# Patient Record
Sex: MALE | Race: BLACK OR AFRICAN AMERICAN | NOT HISPANIC OR LATINO | Employment: FULL TIME | URBAN - METROPOLITAN AREA
[De-identification: names, ages, dates, MRNs, and addresses within clinical notes are randomized per-mention and may not be internally consistent; named-entity substitution may affect disease eponyms.]

---

## 2023-08-16 ENCOUNTER — HOSPITAL ENCOUNTER (OUTPATIENT)
Dept: DATA CONVERSION | Facility: HOSPITAL | Age: 63
Discharge: HOME | End: 2023-08-16
Payer: MEDICARE

## 2023-08-16 DIAGNOSIS — M96.1 POSTLAMINECTOMY SYNDROME, NOT ELSEWHERE CLASSIFIED: ICD-10-CM

## 2023-08-26 LAB
10OH-CARBAZEPINE/CREAT UR CFM: NOT DETECTED NG/MG{CREAT}
6MAM UR QL CFM: NEGATIVE
6MAM/CREAT UR: NORMAL
7AMINOCLONAZEPAM/CREAT UR: NORMAL
8OH-AMOXAPINE UR QL: NOT DETECTED
8OH-LOXAPINE/CREAT UR CFM: NOT DETECTED NG/MG{CREAT}
A-OH ALPRAZ/CREAT UR: NORMAL
A-OH-TRIAZOLAM/CREAT UR CFM: NORMAL NG/MG{CREAT}
ALFENTANIL/CREAT UR CFM: NORMAL NG/MG{CREAT}
ALPHA-HYDROXYMIDAZOLAM: NORMAL
ALPRAZ/CREAT UR CFM: NORMAL NG/MG{CREAT}
AMINO CHLOROPYRIDINE: NOT DETECTED
AMITRIP UR QL CFM: NOT DETECTED
AMOBARBITAL UR QL CFM: NOT DETECTED
AMOXAPINE UR QL: NOT DETECTED
AMPHET/CREAT UR: NORMAL
AMPHETAMINES UR QL CFM: NEGATIVE
ANALGESICS NON-NARCOTIC UR QL: NORMAL
ANTICONVULSANTS UR: NEGATIVE
ANTIDEPRESSANTS UR QL: NORMAL
ANTIHISTAMINES UR QL CFM: NEGATIVE
ANTIPSYCHOTICS UR QL SCN: NEGATIVE
APAP UR QL: PRESENT
ARIPIPRAZOLE/CREAT UR CFM: NOT DETECTED NG/MG{CREAT}
ASENAPINE: NOT DETECTED
ATENOLOL UR QL: NOT DETECTED
ATOMOXETINE/CREAT UR CFM: NOT DETECTED NG/MG{CREAT}
BACLOFEN/CREAT UR CFM: NOT DETECTED NG/MG{CREAT}
BARBITAL UR QL CFM: NOT DETECTED
BARBITURATES UR QL CFM: NEGATIVE
BENZODIAZ UR QL CFM: NEGATIVE
BENZTROPINE UR QL: NOT DETECTED
BROMPHENIRAMINE UR QL: NOT DETECTED
BUPRENORPHINE UR QL CFM: NEGATIVE
BUPRENORPHINE/CREAT UR: NORMAL
BUPROPION UR QL: NOT DETECTED
BUTABARBITAL UR QL CFM: NOT DETECTED
BUTALBITAL UR QL CFM: NOT DETECTED
BUTORPHANOL UR QL CFM: NOT DETECTED
BZE/CREAT UR: NORMAL
CAFFEINE UR QL: NOT DETECTED
CANNABINOIDS UR QL CFM: NEGATIVE
CARBAMAZEPINE UR QL: NOT DETECTED
CARBOXYTHC/CREAT UR: NORMAL
CARISOPRODOL UR QL CFM: NOT DETECTED
CHLORPHENIR UR QL CFM: NOT DETECTED
CHLORPROMAZINE UR QL CFM: NOT DETECTED
CITALOPRAM UR QL: NOT DETECTED
CLOBAZAM UR QL: NOT DETECTED
CLOMIPRAMINE UR QL: NOT DETECTED
CLONAZEPAM/CREAT UR CFM: NORMAL NG/MG{CREAT}
CLONIDINE UR QL: NOT DETECTED
CLOZAPINE UR QL: NOT DETECTED
COCAETHYLENE/CREAT UR CFM: NORMAL NG/MG{CREAT}
COCAINE UR QL CFM: NEGATIVE
COCAINE/CREAT UR CFM: NORMAL NG/MG{CREAT}
CODEINE/CREAT UR: NORMAL
CREAT UR-MCNC: NORMAL MG/DL
CYCLOBENZAPRINE UR QL CFM: NOT DETECTED
D-METHORPHAN UR QL: NOT DETECTED
D-METHORPHAN+LEVORPHANOL UR QL: NORMAL
DESALKYLFLURAZ/CREAT UR: NORMAL NG/MG{CREAT}
DESIPRAMINE UR QL CFM: NOT DETECTED
DESMETHYLCYCLOBENZAPRINE: NOT DETECTED
DESMETHYLFLUNITRAZEPAM: NORMAL
DHC/CREAT UR: NORMAL NG/MG{CREAT}
DIAZEPAM/CREAT UR: NORMAL NG/MG{CREAT}
DICLOFENAC/CREAT UR CFM: NOT DETECTED NG/MG{CREAT}
DIETHYLPROPION UR QL CFM: NOT DETECTED
DILTIAZEM UR QL: NOT DETECTED
DIPHENHY UR QL: NOT DETECTED
DOXEPIN UR QL CFM: NOT DETECTED
DOXYLAMINE UR QL: NOT DETECTED
DRUGS UR CFM: NEGATIVE
DRUGS UR: NORMAL
DULOXETINE UR QL CFM: NOT DETECTED
EDDP/CREAT UR: NORMAL
EPHEDRIN+PSEUDO UR QL: NOT DETECTED
ETHANOL UR CFM-MCNC: NORMAL MG/DL
ETHANOL UR QL CFM: NEGATIVE
EZOGABINE/CREAT UR CFM: NOT DETECTED NG/MG{CREAT}
FENTANYL UR QL CFM: NEGATIVE
FENTANYL/CREAT UR: NORMAL
FLUNITRAZEPAM UR QL CFM: NORMAL
FLUOXETINE UR QL CFM: NOT DETECTED
FLUPHENAZINE UR QL: NOT DETECTED
FLUVOXAMINE UR QL: NOT DETECTED
GABAPENTIN UR QL: NOT DETECTED
GUAIFENESIN/CREAT UR CFM: NOT DETECTED NG/MG{CREAT}
HALLUCINOGENS UR: NEGATIVE
HALOPERIDOL UR QL CFM: NOT DETECTED
HYDROCODONE/CREAT UR: NORMAL
HYDROMORPHONE/CREAT UR: NORMAL
HYDROXYZINE UR QL: NOT DETECTED
HYPNOTICS UR QL SCN: NEGATIVE
IBUPROFEN UR QL: NOT DETECTED
ILOPERIDONE: NOT DETECTED
IMIPRAMINE UR QL CFM: NOT DETECTED
KETAMINE UR QL CFM: NOT DETECTED
KETOPROFEN/CREAT UR CFM: NOT DETECTED NG/MG{CREAT}
LAMOTRIGINE/CREAT UR CFM: NOT DETECTED NG/MG{CREAT}
LEVEL OF DETECTION: (TOX25): NORMAL
LEVETIRACETAM/CREAT UR CFM: NOT DETECTED NG/MG{CREAT}
LIDOCAIN UR QL: NOT DETECTED
LIDOCAIN UR QL: NOT DETECTED
LOCAL ANESTHETICS: NEGATIVE
LORAZEPAM/CREAT UR: NORMAL
LOXAPINE UR QL: NOT DETECTED
LURASIDONE UR CFM-MCNC: NOT DETECTED NG/ML
M-CPP UR QL: PRESENT
MAPROTILINE UR QL: NOT DETECTED
MDA/CREAT UR: NORMAL NG/MG{CREAT}
MDEA UR QL: NOT DETECTED
MDMA/CREAT UR: NORMAL NG/MG{CREAT}
ME-PHENIDATE UR QL CFM: NOT DETECTED
MEPERIDINE UR QL CFM: NOT DETECTED
MEPHOBARBITAL UR QL CFM: NOT DETECTED
MEPIVACAINE UR QL: NOT DETECTED
MEPROBAMATE UR QL CFM: NOT DETECTED
MESORIDAZINE UR QL CFM: NOT DETECTED
METAXALONE/CREAT UR CFM: NOT DETECTED NG/MG{CREAT}
METHADONE UR QL CFM: NEGATIVE
METHADONE/CREAT UR: NORMAL
METHAMPHET/CREAT UR: NORMAL
METHCATHINONE/CREAT UR CFM: NOT DETECTED NG/MG{CREAT}
METHOCARBAMOL UR QL: NOT DETECTED
METOPROLOL UR QL: NOT DETECTED
MIDAZOLAM/CREAT UR CFM: NORMAL NG/MG{CREAT}
MILNACIPRAN/CREAT UR CFM: NOT DETECTED NG/MG{CREAT}
MIRTAZAPINE UR QL CFM: NOT DETECTED
MOLINDONE/CREAT UR CFM: NOT DETECTED NG/MG{CREAT}
MORPHINE/CREAT UR: NORMAL
MUSCLE RELAXANTS: NEGATIVE
N-NORTRAMADOL/CREAT UR CFM: NORMAL NG/MG{CREAT}
NALBUPHINE UR QL CFM: NOT DETECTED
NALTREXONE UR QL CFM: NOT DETECTED
NAPROXEN/CREAT UR CFM: NOT DETECTED NG/MG{CREAT}
NARCOTICS UR: NEGATIVE
NEFAZODONE UR QL: NOT DETECTED
NORBUPRENORPHINE/CREAT UR: NORMAL
NORCITALOPRAM UR QL: NOT DETECTED
NORCLOMIPRAMINE UR QL: NOT DETECTED
NORCLOZAPINE UR QL: NOT DETECTED
NORCODEINE/CREAT UR CFM: NORMAL NG/MG{CREAT}
NORDIAZEPAM/CREAT UR: NORMAL
NORDOXEPIN UR QL: NOT DETECTED
NORFENTANYL/CREAT UR: NORMAL
NORFLUOXETINE UR QL CFM: NOT DETECTED
NORHYDROCODONE/CREAT UR: NORMAL
NORKETAMINE UR CFM-MCNC: NOT DETECTED NG/ML
NORMEPERIDINE UR QL CFM: NOT DETECTED
NORMORPHINE UR-MCNC: NORMAL NG/ML
NOROXYCODONE/CREAT UR: NORMAL
NOROXYMORPHONE/CREAT UR CFM: NORMAL NG/MG{CREAT}
NORPROPOXYPH UR QL CFM: NOT DETECTED
NORSERTRALINE UR QL: NOT DETECTED
NORTRIP UR QL CFM: NOT DETECTED
O-NORTRAMADOL UR CFM-MCNC: NORMAL NG/ML
ODV UR QL: NOT DETECTED
OH-BUPROPION UR QL CFM: NOT DETECTED
OLANZAPINE UR QL: NOT DETECTED
OPIATES UR QL CFM: NEGATIVE
ORPHENADRINE UR QL: NOT DETECTED
OXAPROZIN/CREAT UR CFM: NOT DETECTED NG/MG{CREAT}
OXAZEPAM/CREAT UR: NORMAL
OXYCODONE UR QL CFM: NORMAL
OXYCODONE/CREAT UR: NORMAL
OXYMORPHONE/CREAT UR: NORMAL
PAROXETINE UR QL: NOT DETECTED
PCP UR QL CFM: NOT DETECTED
PENTAZOCINE UR QL CFM: NOT DETECTED
PENTOBARB UR QL CFM: NOT DETECTED
PERPHENAZINE UR QL: NOT DETECTED
PHENMETRAZINE UR QL CFM: NOT DETECTED
PHENOBARB UR QL CFM: NOT DETECTED
PHENTERMINE UR QL CFM: NOT DETECTED
PHENYTOIN UR QL: NOT DETECTED
PIMOZIDE/CREAT UR CFM: NOT DETECTED NG/MG{CREAT}
PPA UR QL: NOT DETECTED
PPAA UR QL: NOT DETECTED
PREGABALIN UR QL CFM: NOT DETECTED
PRIMIDONE/CREAT UR CFM: NOT DETECTED NG/MG{CREAT}
PROCAINE UR QL: NOT DETECTED
PROCHLORPERAZINE UR QL: NOT DETECTED
PROMETHAZINE UR QL: NOT DETECTED
PROPOXYPH UR QL CFM: NOT DETECTED
PROPRANOLOL UR QL: NOT DETECTED
PROTRIP UR QL: NOT DETECTED
PYRILAMINE UR QL: NOT DETECTED
QUETIAPINE UR QL: NOT DETECTED
RISPERIDONE UR QL: NOT DETECTED
RUFINAMIDE/CREAT UR CFM: NOT DETECTED NG/MG{CREAT}
SALICYLATES UR QL: NOT DETECTED
SECOBARBITAL UR QL CFM: NOT DETECTED
SERTRALINE UR QL: NOT DETECTED
SUFENTANIL/CREAT UR CFM: NORMAL NG/MG{CREAT}
SYMPATHOMIMETICS UR QL CFM: NEGATIVE
TAPENTADOL UR QL CFM: NEGATIVE
TAPENTADOL/CREAT UR: NORMAL
TEMAZEPAM/CREAT UR: NORMAL
THEOPHYLLINE/CREAT UR CFM: NOT DETECTED NG/MG{CREAT}
THIOPENTAL UR QL CFM: NOT DETECTED
THIORIDAZINE UR QL CFM: NOT DETECTED
THIOTHIXENE UR QL: NOT DETECTED
TIAGABINE/CREAT UR CFM: NOT DETECTED NG/MG{CREAT}
TIZANIDINE/CREAT UR CFM: NOT DETECTED NG/MG{CREAT}
TOPIRAMATE/CREAT UR CFM: NOT DETECTED NG/MG{CREAT}
TRAMADOL UR QL CFM: NORMAL
TRAZODONE UR QL: PRESENT
TRIFPERAZINE UR QL: NOT DETECTED
TRIMIPRAMINE UR QL: NOT DETECTED
TRIPROLIDINE: NOT DETECTED
VENLAFAXINE UR QL: NOT DETECTED
VERAPAMIL UR QL: NOT DETECTED
VILAZ UR QL: NOT DETECTED
ZALEPLON: NORMAL
ZIPRASIDONE/CREAT UR CFM: NOT DETECTED NG/MG{CREAT}
ZOLPIDEM UR QL CFM: NOT DETECTED
ZOLPIDEM/CREAT UR: NOT DETECTED
ZONISAMIDE/CREAT UR CFM: NOT DETECTED NG/MG{CREAT}
ZOPICLONE UR QL: NOT DETECTED

## 2023-09-13 PROBLEM — B35.1 ONYCHOMYCOSIS: Status: ACTIVE | Noted: 2023-09-13

## 2023-09-13 PROBLEM — R55 SYNCOPE AND COLLAPSE: Status: ACTIVE | Noted: 2023-09-13

## 2023-09-13 PROBLEM — R07.89 TIGHT CHEST: Status: ACTIVE | Noted: 2023-09-13

## 2023-09-13 PROBLEM — L73.2 HYDRADENITIS: Status: ACTIVE | Noted: 2023-09-13

## 2023-09-13 PROBLEM — I10 BENIGN ESSENTIAL HTN: Status: ACTIVE | Noted: 2023-09-13

## 2023-09-13 PROBLEM — F17.210 NICOTINE DEPENDENCE, CIGARETTES, UNCOMPLICATED: Status: ACTIVE | Noted: 2023-09-13

## 2023-09-13 PROBLEM — H91.93 DECREASED HEARING OF BOTH EARS: Status: ACTIVE | Noted: 2023-09-13

## 2023-09-13 PROBLEM — M87.051 AVASCULAR NECROSIS OF BONE OF RIGHT HIP (MULTI): Status: ACTIVE | Noted: 2023-09-13

## 2023-09-13 PROBLEM — R91.8 MULTIPLE LUNG NODULES: Status: ACTIVE | Noted: 2023-09-13

## 2023-09-13 PROBLEM — M87.052 AVASCULAR NECROSIS OF LEFT FEMORAL HEAD (MULTI): Status: ACTIVE | Noted: 2023-09-13

## 2023-09-13 PROBLEM — M16.12 OSTEOARTHRITIS OF LEFT HIP: Status: ACTIVE | Noted: 2023-09-13

## 2023-09-13 PROBLEM — L91.0 KELOID SKIN DISORDER: Status: ACTIVE | Noted: 2023-09-13

## 2023-09-13 PROBLEM — Z98.818 OTHER DENTAL PROCEDURE STATUS: Status: ACTIVE | Noted: 2023-09-13

## 2023-09-13 PROBLEM — E78.5 DYSLIPIDEMIA: Status: ACTIVE | Noted: 2023-09-13

## 2023-09-13 PROBLEM — L28.0 LICHEN SIMPLEX CHRONICUS: Status: ACTIVE | Noted: 2020-05-05

## 2023-09-13 PROBLEM — M54.2 CERVICALGIA: Status: ACTIVE | Noted: 2023-09-13

## 2023-09-13 PROBLEM — I82.409 DEEP VEIN THROMBOSIS (DVT) (MULTI): Status: ACTIVE | Noted: 2023-09-13

## 2023-09-13 PROBLEM — B35.3 TINEA PEDIS: Status: ACTIVE | Noted: 2023-09-13

## 2023-09-13 RX ORDER — TRAZODONE HYDROCHLORIDE 50 MG/1
50 TABLET ORAL NIGHTLY PRN
COMMUNITY
End: 2023-10-13 | Stop reason: WASHOUT

## 2023-09-13 RX ORDER — TRIAMCINOLONE ACETONIDE 1 MG/G
OINTMENT TOPICAL 2 TIMES DAILY
COMMUNITY
Start: 2023-06-19

## 2023-09-13 RX ORDER — TRAZODONE HYDROCHLORIDE 100 MG/1
1 TABLET ORAL NIGHTLY
COMMUNITY
Start: 2018-06-12

## 2023-09-13 RX ORDER — OXYCODONE AND ACETAMINOPHEN 10; 325 MG/1; MG/1
1 TABLET ORAL EVERY 6 HOURS PRN
COMMUNITY
Start: 2023-04-21 | End: 2023-10-13 | Stop reason: SDUPTHER

## 2023-09-13 RX ORDER — LISINOPRIL 10 MG/1
10 TABLET ORAL DAILY
COMMUNITY

## 2023-09-13 RX ORDER — NALOXONE HYDROCHLORIDE 4 MG/.1ML
SPRAY NASAL AS NEEDED
COMMUNITY
Start: 2021-06-22

## 2023-09-13 RX ORDER — CLOBETASOL PROPIONATE 0.5 MG/G
1 OINTMENT TOPICAL
COMMUNITY
Start: 2020-05-05

## 2023-09-13 RX ORDER — IBUPROFEN 200 MG
1 TABLET ORAL DAILY
COMMUNITY
Start: 2021-06-14 | End: 2023-10-13 | Stop reason: WASHOUT

## 2023-09-16 VITALS
BODY MASS INDEX: 24.48 KG/M2 | RESPIRATION RATE: 18 BRPM | OXYGEN SATURATION: 95 % | SYSTOLIC BLOOD PRESSURE: 128 MMHG | HEART RATE: 67 BPM | HEIGHT: 68 IN | DIASTOLIC BLOOD PRESSURE: 68 MMHG

## 2023-10-13 ENCOUNTER — OFFICE VISIT (OUTPATIENT)
Dept: PAIN MEDICINE | Facility: CLINIC | Age: 63
End: 2023-10-13
Payer: COMMERCIAL

## 2023-10-13 VITALS
HEART RATE: 76 BPM | HEIGHT: 68 IN | SYSTOLIC BLOOD PRESSURE: 130 MMHG | DIASTOLIC BLOOD PRESSURE: 80 MMHG | BODY MASS INDEX: 23.57 KG/M2

## 2023-10-13 DIAGNOSIS — M96.1 POSTLAMINECTOMY SYNDROME, LUMBAR REGION: Primary | ICD-10-CM

## 2023-10-13 DIAGNOSIS — Z79.899 HISTORY OF ONGOING TREATMENT WITH HIGH-RISK MEDICATION: ICD-10-CM

## 2023-10-13 PROCEDURE — 99214 OFFICE O/P EST MOD 30 MIN: CPT | Performed by: PHYSICIAN ASSISTANT

## 2023-10-13 PROCEDURE — G0463 HOSPITAL OUTPT CLINIC VISIT: HCPCS | Performed by: PHYSICIAN ASSISTANT

## 2023-10-13 RX ORDER — OXYCODONE AND ACETAMINOPHEN 10; 325 MG/1; MG/1
1 TABLET ORAL EVERY 6 HOURS PRN
Qty: 100 TABLET | Refills: 0 | Status: SHIPPED | OUTPATIENT
Start: 2023-10-13 | End: 2023-11-12 | Stop reason: WASHOUT

## 2023-10-13 RX ORDER — TALC
6 POWDER (GRAM) TOPICAL DAILY
COMMUNITY
Start: 2023-09-26

## 2023-10-13 RX ORDER — OXYCODONE AND ACETAMINOPHEN 10; 325 MG/1; MG/1
1 TABLET ORAL EVERY 6 HOURS PRN
Qty: 110 TABLET | Refills: 0 | Status: SHIPPED | OUTPATIENT
Start: 2023-10-13 | End: 2023-11-28

## 2023-10-13 RX ORDER — NALOXONE HYDROCHLORIDE 4 MG/.1ML
4 SPRAY NASAL AS NEEDED
Qty: 2 EACH | Refills: 0 | Status: SHIPPED | OUTPATIENT
Start: 2023-10-13 | End: 2024-10-12

## 2023-10-13 ASSESSMENT — ENCOUNTER SYMPTOMS
SHORTNESS OF BREATH: 0
WHEEZING: 0
FATIGUE: 0
BACK PAIN: 1
ACTIVITY CHANGE: 0
NAUSEA: 0
FEVER: 0
DIARRHEA: 0
UNEXPECTED WEIGHT CHANGE: 0
COUGH: 0
CHILLS: 0
SLEEP DISTURBANCE: 0
VOMITING: 0
VOICE CHANGE: 0
PALPITATIONS: 0

## 2023-10-13 ASSESSMENT — PAIN - FUNCTIONAL ASSESSMENT: PAIN_FUNCTIONAL_ASSESSMENT: 0-10

## 2023-10-13 ASSESSMENT — PATIENT HEALTH QUESTIONNAIRE - PHQ9
1. LITTLE INTEREST OR PLEASURE IN DOING THINGS: NOT AT ALL
2. FEELING DOWN, DEPRESSED OR HOPELESS: NOT AT ALL
SUM OF ALL RESPONSES TO PHQ9 QUESTIONS 1 AND 2: 0

## 2023-10-13 ASSESSMENT — PAIN DESCRIPTION - DESCRIPTORS: DESCRIPTORS: ACHING

## 2023-10-13 ASSESSMENT — PAIN SCALES - GENERAL: PAINLEVEL_OUTOF10: 5 - MODERATE PAIN

## 2023-10-13 ASSESSMENT — LIFESTYLE VARIABLES: TOTAL SCORE: 0

## 2023-10-13 NOTE — PROGRESS NOTES
MEDICATION NAME: oxycodone w apap  STRENGTH: 10/325  LAST FILL DATE:   DATE LAST TAKEN: 10/13  QUANTITY FILLED: 100  QUANTITY REMAIN  COUNT COMPLETED BY: PETER ENGEL RN and DEION ZENG

## 2023-10-13 NOTE — PROGRESS NOTES
Subjective   Patient ID: Sarah Mg is a 62 y.o. male who presents for Back Pain.  Patient is a 62-year-old male with a workers comp related injury involving failed back syndrome that presents today for follow-up.  Patient continues to have axial back pain radiating into the left lower extremity.  He has been having other systemic aches and pains related to weather pattern changes and decrease in temperature.  He denies any injuries or trauma he continues to get denied procedures that have been ordered by Dr. Sheffield's were reported to be pelvic or epidural injections the patient cannot verbalize the exact procedure but he is frustrated that he has not been able to get additional treatment to reduce his pain.     Back Pain  Pertinent negatives include no chest pain or fever.       Review of Systems   Constitutional:  Negative for activity change, chills, fatigue, fever and unexpected weight change.   HENT:  Negative for ear pain and voice change.    Eyes:  Negative for visual disturbance.   Respiratory:  Negative for cough, shortness of breath and wheezing.    Cardiovascular:  Negative for chest pain and palpitations.   Gastrointestinal:  Negative for diarrhea, nausea and vomiting.   Musculoskeletal:  Positive for back pain and gait problem.   Psychiatric/Behavioral:  Negative for behavioral problems, self-injury, sleep disturbance and suicidal ideas.        Objective   Physical Exam  Musculoskeletal:      Lumbar back: Spasms and tenderness present. Positive left straight leg raise test.        Back:       Comments: STR WNL, hypo sensation lateral lle thigh. DTR = BLE, antalgic gait.          Assessment/Plan   Problem List Items Addressed This Visit             ICD-10-CM    Postlaminectomy syndrome, lumbar region - Primary M96.1    Relevant Medications    naloxone (Narcan) 4 mg/0.1 mL nasal spray     Other Visit Diagnoses         Codes    History of ongoing treatment with high-risk medication     Z79.899     Relevant Medications    oxyCODONE-acetaminophen (Percocet)  mg tablet    oxyCODONE-acetaminophen (Percocet)  mg tablet        I had nice discussion with the patient today our plan will be as follows.      Radiology: [ none at this time ]      Physically:  [ continue modification of activities, healthy lifestyle choice ]      Psychologically:  [ No acute psychological concerns ]      Medication: [I will refill the medications at the same dose and frequency. We will continue to monitor the patient bimonthly for compliance, adverse reaction or interations The patient continues to see benefit and improvement in their quality of life and ability to maintain ADLs. Patient educated about the risks of taking opioids and operating a motor vehicle. Patient reports no adverse side effects to current medication regimen. Current regimen does allow patient to maintain ADLs. Oarrs has been reviewed. No suspicion of diversion or abuse. Compliance with medication regime, no use of illicit drugs, no sharing of narcotic medications with others, do not use others narcotic medication, and to avoid alcohol use. Patient has been educated on the risks, benefits, and alternatives of controlled substances as well as the proper way to store these medications.   The patient and I discussed the nature of this medication and its side effects. We discussed tolerance, physical dependence, psychological dependence, addiction and opioid-induced hyperalgesia.    Rx alter for pt travel to [place] 10/28/23 for 3 weeks.  ]      Duration:  [ 2 months ]      Intervention:  [ none at this time. Patient is stable.  ]

## 2023-11-27 DIAGNOSIS — Z79.899 HISTORY OF ONGOING TREATMENT WITH HIGH-RISK MEDICATION: ICD-10-CM

## 2023-11-28 RX ORDER — OXYCODONE AND ACETAMINOPHEN 10; 325 MG/1; MG/1
1 TABLET ORAL EVERY 6 HOURS PRN
Qty: 110 TABLET | Refills: 0 | Status: SHIPPED | OUTPATIENT
Start: 2023-11-28 | End: 2023-12-08 | Stop reason: SDUPTHER

## 2023-12-08 ENCOUNTER — OFFICE VISIT (OUTPATIENT)
Dept: PAIN MEDICINE | Facility: CLINIC | Age: 63
End: 2023-12-08
Payer: COMMERCIAL

## 2023-12-08 VITALS
RESPIRATION RATE: 16 BRPM | WEIGHT: 155 LBS | HEIGHT: 68 IN | DIASTOLIC BLOOD PRESSURE: 80 MMHG | BODY MASS INDEX: 23.49 KG/M2 | SYSTOLIC BLOOD PRESSURE: 122 MMHG

## 2023-12-08 DIAGNOSIS — M96.1 POSTLAMINECTOMY SYNDROME OF LUMBAR REGION: ICD-10-CM

## 2023-12-08 DIAGNOSIS — F17.210 NICOTINE DEPENDENCE, CIGARETTES, UNCOMPLICATED: ICD-10-CM

## 2023-12-08 DIAGNOSIS — Z79.899 HISTORY OF ONGOING TREATMENT WITH HIGH-RISK MEDICATION: Primary | ICD-10-CM

## 2023-12-08 PROBLEM — I82.409 DEEP VEIN THROMBOSIS (DVT) (MULTI): Status: ACTIVE | Noted: 2021-12-06

## 2023-12-08 PROBLEM — M25.551 RIGHT HIP PAIN: Status: ACTIVE | Noted: 2021-12-06

## 2023-12-08 PROBLEM — D68.59 HYPERCOAGULABLE STATE (MULTI): Status: ACTIVE | Noted: 2023-06-14

## 2023-12-08 PROBLEM — R55 SYNCOPAL EPISODES: Status: ACTIVE | Noted: 2021-12-06

## 2023-12-08 PROBLEM — N52.9 ERECTILE DYSFUNCTION: Status: ACTIVE | Noted: 2023-12-08

## 2023-12-08 PROBLEM — F52.21 MALE ERECTILE DISORDER (CODE): Status: ACTIVE | Noted: 2023-12-08

## 2023-12-08 PROBLEM — R35.1 NOCTURIA: Status: ACTIVE | Noted: 2023-12-08

## 2023-12-08 PROBLEM — M54.9 CHRONIC BACK PAIN GREATER THAN 3 MONTHS DURATION: Status: ACTIVE | Noted: 2023-12-08

## 2023-12-08 PROBLEM — I10 BENIGN ESSENTIAL HYPERTENSION: Status: ACTIVE | Noted: 2023-12-08

## 2023-12-08 PROBLEM — F41.9 ANXIETY: Status: ACTIVE | Noted: 2023-12-08

## 2023-12-08 PROBLEM — D64.9 ANEMIA: Status: ACTIVE | Noted: 2023-12-08

## 2023-12-08 PROBLEM — G47.00 INSOMNIA: Status: ACTIVE | Noted: 2023-12-08

## 2023-12-08 PROBLEM — E55.9 VITAMIN D DEFICIENCY: Status: ACTIVE | Noted: 2023-12-08

## 2023-12-08 PROBLEM — G89.29 CHRONIC BACK PAIN GREATER THAN 3 MONTHS DURATION: Status: ACTIVE | Noted: 2023-12-08

## 2023-12-08 PROBLEM — H90.3 SENSORINEURAL HEARING LOSS, BILATERAL: Status: ACTIVE | Noted: 2023-12-08

## 2023-12-08 PROBLEM — Z96.641 STATUS POST RIGHT HIP REPLACEMENT: Status: ACTIVE | Noted: 2021-12-21

## 2023-12-08 PROBLEM — M25.519 SHOULDER PAIN: Status: ACTIVE | Noted: 2023-12-08

## 2023-12-08 PROBLEM — N18.31 STAGE 3A CHRONIC KIDNEY DISEASE (MULTI): Status: ACTIVE | Noted: 2023-06-19

## 2023-12-08 PROBLEM — R63.4 WEIGHT LOSS: Status: ACTIVE | Noted: 2023-12-08

## 2023-12-08 PROBLEM — F10.11 HISTORY OF ALCOHOL ABUSE: Status: ACTIVE | Noted: 2023-12-08

## 2023-12-08 PROCEDURE — 80307 DRUG TEST PRSMV CHEM ANLYZR: CPT | Performed by: PHYSICIAN ASSISTANT

## 2023-12-08 PROCEDURE — 99214 OFFICE O/P EST MOD 30 MIN: CPT | Performed by: PHYSICIAN ASSISTANT

## 2023-12-08 PROCEDURE — 82570 ASSAY OF URINE CREATININE: CPT | Mod: 59 | Performed by: PHYSICIAN ASSISTANT

## 2023-12-08 PROCEDURE — 80368 SEDATIVE HYPNOTICS: CPT | Performed by: PHYSICIAN ASSISTANT

## 2023-12-08 PROCEDURE — G0463 HOSPITAL OUTPT CLINIC VISIT: HCPCS | Performed by: PHYSICIAN ASSISTANT

## 2023-12-08 RX ORDER — LISINOPRIL AND HYDROCHLOROTHIAZIDE 10; 12.5 MG/1; MG/1
1 TABLET ORAL DAILY
COMMUNITY
Start: 2023-10-10

## 2023-12-08 RX ORDER — OXYCODONE AND ACETAMINOPHEN 10; 325 MG/1; MG/1
1 TABLET ORAL EVERY 6 HOURS PRN
Qty: 110 TABLET | Refills: 0 | Status: SHIPPED | OUTPATIENT
Start: 2023-12-08 | End: 2024-02-22 | Stop reason: SDUPTHER

## 2023-12-08 RX ORDER — OXYCODONE AND ACETAMINOPHEN 10; 325 MG/1; MG/1
1 TABLET ORAL EVERY 6 HOURS PRN
Qty: 110 TABLET | Refills: 0 | Status: SHIPPED | OUTPATIENT
Start: 2023-12-08 | End: 2024-04-22 | Stop reason: SDUPTHER

## 2023-12-08 RX ORDER — CYCLOBENZAPRINE HCL 5 MG
5 TABLET ORAL NIGHTLY PRN
Qty: 30 TABLET | Refills: 1 | Status: SHIPPED | OUTPATIENT
Start: 2023-12-08 | End: 2025-02-22

## 2023-12-08 RX ORDER — CYCLOBENZAPRINE HCL 5 MG
5 TABLET ORAL NIGHTLY PRN
COMMUNITY
Start: 2023-06-21 | End: 2023-12-08 | Stop reason: SDUPTHER

## 2023-12-08 ASSESSMENT — PATIENT HEALTH QUESTIONNAIRE - PHQ9
2. FEELING DOWN, DEPRESSED OR HOPELESS: NOT AT ALL
SUM OF ALL RESPONSES TO PHQ9 QUESTIONS 1 AND 2: 0
1. LITTLE INTEREST OR PLEASURE IN DOING THINGS: NOT AT ALL

## 2023-12-08 ASSESSMENT — ENCOUNTER SYMPTOMS
VOMITING: 0
CHILLS: 0
VOICE CHANGE: 0
ACTIVITY CHANGE: 0
BACK PAIN: 1
SLEEP DISTURBANCE: 0
PALPITATIONS: 0
NAUSEA: 0
DIARRHEA: 0
FATIGUE: 0
UNEXPECTED WEIGHT CHANGE: 0
COUGH: 0
SHORTNESS OF BREATH: 0
WHEEZING: 0
FEVER: 0

## 2023-12-08 ASSESSMENT — PAIN DESCRIPTION - DESCRIPTORS: DESCRIPTORS: ACHING;DULL

## 2023-12-08 ASSESSMENT — PAIN SCALES - GENERAL
PAINLEVEL_OUTOF10: 6
PAINLEVEL: 6

## 2023-12-08 ASSESSMENT — LIFESTYLE VARIABLES: TOTAL SCORE: 0

## 2023-12-08 ASSESSMENT — PAIN - FUNCTIONAL ASSESSMENT: PAIN_FUNCTIONAL_ASSESSMENT: 0-10

## 2023-12-08 NOTE — PROGRESS NOTES
Subjective   Patient ID: Sarah Mg is a 63 y.o. male who presents for Back Pain.  Patient is a 63-year-old male with postlaminectomy syndrome nicotine dependence and high risk use of medications presents today for follow-up.  Patient denies of Dr. Sheffield is still attempting to try to get injections approved.  There seems to be some new to allowable conditions to his Worker's Comp. claim.  Continues to have axial back pain continued bilateral hip pain and pressure.    Back Pain  Pertinent negatives include no chest pain or fever.       Review of Systems   Constitutional:  Negative for activity change, chills, fatigue, fever and unexpected weight change.   HENT:  Negative for ear pain and voice change.    Eyes:  Negative for visual disturbance.   Respiratory:  Negative for cough, shortness of breath and wheezing.    Cardiovascular:  Negative for chest pain and palpitations.   Gastrointestinal:  Negative for diarrhea, nausea and vomiting.   Musculoskeletal:  Positive for back pain and gait problem.   Psychiatric/Behavioral:  Negative for behavioral problems, self-injury, sleep disturbance and suicidal ideas.        Objective   Physical Exam  Vitals reviewed.   Constitutional:       Appearance: Normal appearance.   HENT:      Head: Normocephalic and atraumatic.      Mouth/Throat:      Mouth: Mucous membranes are moist.   Neck:      Vascular: No JVD.   Pulmonary:      Effort: Pulmonary effort is normal. No tachypnea or bradypnea.   Abdominal:      Palpations: Abdomen is soft.   Musculoskeletal:      Lumbar back: Spasms and tenderness present. Positive left straight leg raise test.        Back:       Comments: STR WNL, hypo sensation lateral lle thigh. DTR = BLE, antalgic gait.    Skin:     General: Skin is warm and dry.   Neurological:      Mental Status: He is alert and oriented to person, place, and time.   Psychiatric:         Mood and Affect: Mood normal.         Behavior: Behavior normal. Behavior is  cooperative.         Assessment/Plan   Problem List Items Addressed This Visit             ICD-10-CM    Nicotine dependence, cigarettes, uncomplicated F17.210    Postlaminectomy syndrome of lumbar region M96.1    Relevant Orders    Opiate/Opioid/Benzo Prescription Compliance    History of ongoing treatment with high-risk medication - Primary Z79.899    Relevant Orders    Opiate/Opioid/Benzo Prescription Compliance   I had nice discussion with the patient today our plan will be as follows.      Radiology: [ none at this time ]      Physically:  [ continue modification of activities, healthy lifestyle choice, Patient smokes 1ppd. Encouraged/counseled on smoking cessation as this may increase systemic inflammatory factors which may contribute to patient's chronic pain in addition to smoking as generalized health detriments.   ]      Psychologically:  [ No acute psychological concerns ]      Medication: [I will refill the medications at the same dose and frequency. We will continue to monitor the patient bimonthly for compliance, adverse reaction or interations The patient continues to see benefit and improvement in their quality of life and ability to maintain ADLs. Patient educated about the risks of taking opioids and operating a motor vehicle. Patient reports no adverse side effects to current medication regimen. Current regimen does allow patient to maintain ADLs. Oarrs has been reviewed. No suspicion of diversion or abuse. Compliance with medication regime, no use of illicit drugs, no sharing of narcotic medications with others, do not use others narcotic medication, and to avoid alcohol use. Patient has been educated on the risks, benefits, and alternatives of controlled substances as well as the proper way to store these medications.   The patient and I discussed the nature of this medication and its side effects. We discussed tolerance, physical dependence, psychological dependence, addiction and opioid-induced  hyperalgesia ]      Duration:  [2 months  ]      Intervention:  [ none at this time. Patient is stable.  ]           Vito Chaves PA-C 12/08/23 11:42 AM

## 2023-12-08 NOTE — PROGRESS NOTES
MEDICATION NAME: percocet  STRENGTH: 10/325mg  LAST FILL DATE: 23  DATE LAST TAKEN: 23 pm  QUANTITY FILLED: 110  QUANTITY REMAININ  COUNT COMPLETED BY: YAKELIN GUTIERREZ LPN and PETER ENGEL RN        CONTROLLED SUBSTANCES AGREEMENT LAST SIGNED: 2023  ORT LAST COMPLETED:2023  Modified [employer] disability form filled out annually.

## 2023-12-09 LAB
AMPHETAMINES UR QL SCN: NORMAL
BARBITURATES UR QL SCN: NORMAL
BZE UR QL SCN: NORMAL
CANNABINOIDS UR QL SCN: NORMAL
CREAT UR-MCNC: 163.3 MG/DL (ref 20–370)
PCP UR QL SCN: NORMAL

## 2023-12-13 LAB
1OH-MIDAZOLAM UR CFM-MCNC: <25 NG/ML
6MAM UR CFM-MCNC: <25 NG/ML
7AMINOCLONAZEPAM UR CFM-MCNC: <25 NG/ML
A-OH ALPRAZ UR CFM-MCNC: <25 NG/ML
ALPRAZ UR CFM-MCNC: <25 NG/ML
CHLORDIAZEP UR CFM-MCNC: <25 NG/ML
CLONAZEPAM UR CFM-MCNC: <25 NG/ML
CODEINE UR CFM-MCNC: <50 NG/ML
DIAZEPAM UR CFM-MCNC: <25 NG/ML
EDDP UR CFM-MCNC: <25 NG/ML
FENTANYL UR CFM-MCNC: <2.5 NG/ML
HYDROCODONE CTO UR CFM-MCNC: <25 NG/ML
HYDROMORPHONE UR CFM-MCNC: <25 NG/ML
LORAZEPAM UR CFM-MCNC: <25 NG/ML
METHADONE UR CFM-MCNC: <25 NG/ML
MIDAZOLAM UR CFM-MCNC: <25 NG/ML
MORPHINE UR CFM-MCNC: <50 NG/ML
NORDIAZEPAM UR CFM-MCNC: <25 NG/ML
NORFENTANYL UR CFM-MCNC: <2.5 NG/ML
NORHYDROCODONE UR CFM-MCNC: <25 NG/ML
NOROXYCODONE UR CFM-MCNC: >1000 NG/ML
NORTRAMADOL UR-MCNC: <50 NG/ML
OXAZEPAM UR CFM-MCNC: <25 NG/ML
OXYCODONE UR CFM-MCNC: 872 NG/ML
OXYMORPHONE UR CFM-MCNC: 1944 NG/ML
TEMAZEPAM UR CFM-MCNC: <25 NG/ML
TRAMADOL UR CFM-MCNC: <50 NG/ML
ZOLPIDEM UR CFM-MCNC: <25 NG/ML
ZOLPIDEM UR-MCNC: <25 NG/ML

## 2024-02-22 ENCOUNTER — OFFICE VISIT (OUTPATIENT)
Dept: PAIN MEDICINE | Facility: CLINIC | Age: 64
End: 2024-02-22
Payer: COMMERCIAL

## 2024-02-22 VITALS
SYSTOLIC BLOOD PRESSURE: 132 MMHG | DIASTOLIC BLOOD PRESSURE: 79 MMHG | HEART RATE: 68 BPM | HEIGHT: 68 IN | BODY MASS INDEX: 23.49 KG/M2 | WEIGHT: 155 LBS

## 2024-02-22 DIAGNOSIS — Z79.899 HISTORY OF ONGOING TREATMENT WITH HIGH-RISK MEDICATION: Primary | ICD-10-CM

## 2024-02-22 DIAGNOSIS — M96.1 POSTLAMINECTOMY SYNDROME OF LUMBAR REGION: ICD-10-CM

## 2024-02-22 PROCEDURE — 99214 OFFICE O/P EST MOD 30 MIN: CPT | Performed by: PHYSICIAN ASSISTANT

## 2024-02-22 PROCEDURE — 80346 BENZODIAZEPINES1-12: CPT | Performed by: PHYSICIAN ASSISTANT

## 2024-02-22 PROCEDURE — 80307 DRUG TEST PRSMV CHEM ANLYZR: CPT | Performed by: PHYSICIAN ASSISTANT

## 2024-02-22 PROCEDURE — G0463 HOSPITAL OUTPT CLINIC VISIT: HCPCS | Performed by: PHYSICIAN ASSISTANT

## 2024-02-22 RX ORDER — OXYCODONE AND ACETAMINOPHEN 10; 325 MG/1; MG/1
1 TABLET ORAL EVERY 6 HOURS PRN
Qty: 110 TABLET | Refills: 0 | Status: SHIPPED | OUTPATIENT
Start: 2024-02-22 | End: 2024-04-22 | Stop reason: SDUPTHER

## 2024-02-22 RX ORDER — OXYCODONE AND ACETAMINOPHEN 10; 325 MG/1; MG/1
1 TABLET ORAL EVERY 6 HOURS PRN
Qty: 110 TABLET | Refills: 0 | Status: SHIPPED | OUTPATIENT
Start: 2024-02-22 | End: 2024-04-22 | Stop reason: WASHOUT

## 2024-02-22 RX ORDER — CYCLOBENZAPRINE HCL 10 MG
10 TABLET ORAL 3 TIMES DAILY PRN
Qty: 21 TABLET | Refills: 2 | Status: SHIPPED | OUTPATIENT
Start: 2024-02-22 | End: 2024-03-14

## 2024-02-22 ASSESSMENT — ENCOUNTER SYMPTOMS
VOICE CHANGE: 0
COUGH: 0
ACTIVITY CHANGE: 0
VOMITING: 0
SLEEP DISTURBANCE: 0
SHORTNESS OF BREATH: 0
UNEXPECTED WEIGHT CHANGE: 0
FATIGUE: 0
CHILLS: 0
FEVER: 0
DIARRHEA: 0
BACK PAIN: 1
WHEEZING: 0
NAUSEA: 0
PALPITATIONS: 0

## 2024-02-22 ASSESSMENT — PAIN DESCRIPTION - DESCRIPTORS: DESCRIPTORS: DULL;ACHING

## 2024-02-22 ASSESSMENT — PAIN - FUNCTIONAL ASSESSMENT: PAIN_FUNCTIONAL_ASSESSMENT: 0-10

## 2024-02-22 ASSESSMENT — PAIN SCALES - GENERAL: PAINLEVEL_OUTOF10: 6

## 2024-02-22 NOTE — PROGRESS NOTES
Subjective   Patient ID: Sarah Mg is a 63 y.o. male who presents for Back Pain.  HPI    Review of Systems   Constitutional:  Negative for activity change, chills, fatigue, fever and unexpected weight change.   HENT:  Negative for ear pain and voice change.    Eyes:  Negative for visual disturbance.   Respiratory:  Negative for cough, shortness of breath and wheezing.    Cardiovascular:  Negative for chest pain and palpitations.   Gastrointestinal:  Negative for diarrhea, nausea and vomiting.   Musculoskeletal:  Positive for back pain and gait problem.   Psychiatric/Behavioral:  Negative for behavioral problems, self-injury, sleep disturbance and suicidal ideas.        Objective   Physical Exam  Vitals reviewed.   Constitutional:       Appearance: Normal appearance.   HENT:      Head: Normocephalic and atraumatic.      Mouth/Throat:      Mouth: Mucous membranes are moist.   Neck:      Vascular: No JVD.   Pulmonary:      Effort: Pulmonary effort is normal. No tachypnea or bradypnea.   Abdominal:      Palpations: Abdomen is soft.   Musculoskeletal:      Lumbar back: Spasms and tenderness present. Positive left straight leg raise test.        Back:       Comments: STR WNL, hypo sensation lateral lle thigh. DTR = BLE, antalgic gait.    Skin:     General: Skin is warm and dry.   Neurological:      Mental Status: He is alert and oriented to person, place, and time.   Psychiatric:         Mood and Affect: Mood normal.         Behavior: Behavior normal. Behavior is cooperative.         Assessment/Plan   Problem List Items Addressed This Visit             ICD-10-CM    Postlaminectomy syndrome of lumbar region M96.1     I had nice discussion with the patient today our plan will be as follows.      Radiology: [ none at this time ]      Physically:  [ continue modification of activities, healthy lifestyle choice ]      Psychologically:  [ No acute psychological concerns ]      Medication: [I will refill the medications at  the same dose and frequency. We will continue to monitor the patient bimonthly for compliance, adverse reaction or interations The patient continues to see benefit and improvement in their quality of life and ability to maintain ADLs. Patient educated about the risks of taking opioids and operating a motor vehicle. Patient reports no adverse side effects to current medication regimen. Current regimen does allow patient to maintain ADLs. Oarrs has been reviewed. No suspicion of diversion or abuse. Compliance with medication regime, no use of illicit drugs, no sharing of narcotic medications with others, do not use others narcotic medication, and to avoid alcohol use. Patient has been educated on the risks, benefits, and alternatives of controlled substances as well as the proper way to store these medications.   The patient and I discussed the nature of this medication and its side effects. We discussed tolerance, physical dependence, psychological dependence, addiction and opioid-induced hyperalgesia   Pt to submit sample for tox screen. ]      Duration:  [ 2 month ]      Intervention:  [ none at this time. Patient is stable.  ]         Vito Chaves PA-C 02/22/24 11:17 AM

## 2024-02-22 NOTE — PROGRESS NOTES
MEDICATION NAME: oxycodone w/apap  STRENGTH: 10 mg  LAST FILL DATE: 2024  DATE LAST TAKEN: 2024  QUANTITY FILLED: 110  QUANTITY REMAININ  COUNT COMPLETED BY: JOAN ELKINS and GUILLE Cameron    CONTROLLED SUBSTANCES AGREEMENT LAST SIGNED: 2024  ORT LAST COMPLETED: 2023  Modified [employer] disability form filled out annually.    spO2: 100

## 2024-02-23 LAB
AMPHETAMINES UR QL SCN: NORMAL
BARBITURATES UR QL SCN: NORMAL
BZE UR QL SCN: NORMAL
CANNABINOIDS UR QL SCN: NORMAL
CREAT UR-MCNC: 135.7 MG/DL (ref 20–370)
PCP UR QL SCN: NORMAL

## 2024-02-29 LAB
1OH-MIDAZOLAM UR CFM-MCNC: <25 NG/ML
6MAM UR CFM-MCNC: <25 NG/ML
7AMINOCLONAZEPAM UR CFM-MCNC: <25 NG/ML
A-OH ALPRAZ UR CFM-MCNC: <25 NG/ML
ALPRAZ UR CFM-MCNC: <25 NG/ML
CHLORDIAZEP UR CFM-MCNC: <25 NG/ML
CLONAZEPAM UR CFM-MCNC: <25 NG/ML
CODEINE UR CFM-MCNC: <50 NG/ML
DIAZEPAM UR CFM-MCNC: <25 NG/ML
EDDP UR CFM-MCNC: <25 NG/ML
FENTANYL UR CFM-MCNC: <2.5 NG/ML
HYDROCODONE CTO UR CFM-MCNC: <25 NG/ML
HYDROMORPHONE UR CFM-MCNC: <25 NG/ML
LORAZEPAM UR CFM-MCNC: <25 NG/ML
METHADONE UR CFM-MCNC: <25 NG/ML
MIDAZOLAM UR CFM-MCNC: <25 NG/ML
MORPHINE UR CFM-MCNC: <50 NG/ML
NORDIAZEPAM UR CFM-MCNC: <25 NG/ML
NORFENTANYL UR CFM-MCNC: <2.5 NG/ML
NORHYDROCODONE UR CFM-MCNC: <25 NG/ML
NOROXYCODONE UR CFM-MCNC: >1000 NG/ML
NORTRAMADOL UR-MCNC: <50 NG/ML
OXAZEPAM UR CFM-MCNC: <25 NG/ML
OXYCODONE UR CFM-MCNC: 451 NG/ML
OXYMORPHONE UR CFM-MCNC: 1266 NG/ML
TEMAZEPAM UR CFM-MCNC: <25 NG/ML
TRAMADOL UR CFM-MCNC: <50 NG/ML
ZOLPIDEM UR CFM-MCNC: <25 NG/ML
ZOLPIDEM UR-MCNC: <25 NG/ML

## 2024-04-22 ENCOUNTER — OFFICE VISIT (OUTPATIENT)
Dept: PAIN MEDICINE | Facility: CLINIC | Age: 64
End: 2024-04-22
Payer: COMMERCIAL

## 2024-04-22 VITALS
DIASTOLIC BLOOD PRESSURE: 80 MMHG | OXYGEN SATURATION: 98 % | HEIGHT: 68 IN | RESPIRATION RATE: 18 BRPM | HEART RATE: 66 BPM | BODY MASS INDEX: 22.88 KG/M2 | SYSTOLIC BLOOD PRESSURE: 130 MMHG | WEIGHT: 151 LBS

## 2024-04-22 DIAGNOSIS — M96.1 POSTLAMINECTOMY SYNDROME OF LUMBAR REGION: ICD-10-CM

## 2024-04-22 PROCEDURE — 99214 OFFICE O/P EST MOD 30 MIN: CPT | Performed by: PHYSICIAN ASSISTANT

## 2024-04-22 RX ORDER — OXYCODONE AND ACETAMINOPHEN 10; 325 MG/1; MG/1
1 TABLET ORAL EVERY 6 HOURS PRN
Qty: 110 TABLET | Refills: 0 | Status: SHIPPED | OUTPATIENT
Start: 2024-04-22

## 2024-04-22 ASSESSMENT — ENCOUNTER SYMPTOMS
ACTIVITY CHANGE: 0
FATIGUE: 0
NAUSEA: 0
VOMITING: 0
BACK PAIN: 1
UNEXPECTED WEIGHT CHANGE: 0
FEVER: 0
COUGH: 0
SLEEP DISTURBANCE: 0
CHILLS: 0
SHORTNESS OF BREATH: 0
DIARRHEA: 0
PALPITATIONS: 0
WHEEZING: 0
VOICE CHANGE: 0

## 2024-04-22 ASSESSMENT — LIFESTYLE VARIABLES
AUDIT-C TOTAL SCORE: 0
HOW MANY STANDARD DRINKS CONTAINING ALCOHOL DO YOU HAVE ON A TYPICAL DAY: PATIENT DOES NOT DRINK
HOW OFTEN DO YOU HAVE SIX OR MORE DRINKS ON ONE OCCASION: NEVER
HOW OFTEN DO YOU HAVE A DRINK CONTAINING ALCOHOL: NEVER
SKIP TO QUESTIONS 9-10: 1

## 2024-04-22 ASSESSMENT — PATIENT HEALTH QUESTIONNAIRE - PHQ9
2. FEELING DOWN, DEPRESSED OR HOPELESS: NOT AT ALL
SUM OF ALL RESPONSES TO PHQ9 QUESTIONS 1 & 2: 0
1. LITTLE INTEREST OR PLEASURE IN DOING THINGS: NOT AT ALL

## 2024-04-22 ASSESSMENT — PAIN SCALES - GENERAL
PAINLEVEL: 7
PAINLEVEL_OUTOF10: 7

## 2024-04-22 ASSESSMENT — PAIN - FUNCTIONAL ASSESSMENT: PAIN_FUNCTIONAL_ASSESSMENT: 0-10

## 2024-04-22 ASSESSMENT — PAIN DESCRIPTION - DESCRIPTORS: DESCRIPTORS: ACHING;DULL

## 2024-04-22 NOTE — PROGRESS NOTES
MEDICATION NAME: Oxycodone   STRENGTH:   LAST FILL DATE: 3/27/24  DATE LAST TAKEN: 4/21/24  QUANTITY FILLED: 110  QUANTITY REMAINING: 10  COUNT COMPLETED BY: GUILLE Cameron and SAMY Magana      UDS LAST COMPLETED:   CONTROLLED SUBSTANCES AGREEMENT LAST SIGNED:   ORT LAST COMPLETED:  Modified [employer] disability form filled out annually.

## 2024-04-22 NOTE — PROGRESS NOTES
Subjective   Patient ID: Sarah Mg is a 63 y.o. male who presents for Back Pain.  The Worker's Comp. related injury of postlaminectomy syndrome presents today for follow-up.  Patient continues to have axial back pain left-sided hip pain patient did notes that procedures have been approved after his physician recommended them but then imaging was required there is been delayed due to not being able to complete them during the approval peroid.  Patient states that he is just waiting for Worker's Comp. to allow the treatments that the surgeon is recommending.  He continues to use his medication continues to modify his activity he denies any specific new injuries traumas or disprove fusional pain patterns    Back Pain  Pertinent negatives include no chest pain or fever.       Review of Systems   Constitutional:  Negative for activity change, chills, fatigue, fever and unexpected weight change.   HENT:  Negative for ear pain and voice change.    Eyes:  Negative for visual disturbance.   Respiratory:  Negative for cough, shortness of breath and wheezing.    Cardiovascular:  Negative for chest pain and palpitations.   Gastrointestinal:  Negative for diarrhea, nausea and vomiting.   Musculoskeletal:  Positive for back pain and gait problem.   Psychiatric/Behavioral:  Negative for behavioral problems, self-injury, sleep disturbance and suicidal ideas.        Objective   Physical Exam  Vitals reviewed.   Constitutional:       Appearance: Normal appearance.   HENT:      Head: Normocephalic and atraumatic.      Mouth/Throat:      Mouth: Mucous membranes are moist.   Neck:      Vascular: No JVD.   Pulmonary:      Effort: Pulmonary effort is normal. No tachypnea or bradypnea.   Abdominal:      Palpations: Abdomen is soft.   Musculoskeletal:      Lumbar back: Spasms and tenderness present. Positive left straight leg raise test.        Back:       Comments: STR WNL, hypo sensation lateral lle thigh. DTR = BLE, antalgic gait.     Skin:     General: Skin is warm and dry.   Neurological:      Mental Status: He is alert and oriented to person, place, and time.   Psychiatric:         Mood and Affect: Mood normal.         Behavior: Behavior normal. Behavior is cooperative.       Assessment/Plan   Problem List Items Addressed This Visit             ICD-10-CM    Postlaminectomy syndrome of lumbar region M96.1     I had nice discussion with the patient today our plan will be as follows.      Radiology: [ Pt is awaiting extension for imaging approvals ]      Physically:  [ continue modification of activities, healthy lifestyle choice ]      Psychologically:  [ No acute psychological concerns ]      Medication: [I will refill the medications at the same dose and frequency. We will continue to monitor the patient bimonthly for compliance, adverse reaction or interations The patient continues to see benefit and improvement in their quality of life and ability to maintain ADLs. Patient educated about the risks of taking opioids and operating a motor vehicle. Patient reports no adverse side effects to current medication regimen. Current regimen does allow patient to maintain ADLs. Oarrs has been reviewed. No suspicion of diversion or abuse. Compliance with medication regime, no use of illicit drugs, no sharing of narcotic medications with others, do not use others narcotic medication, and to avoid alcohol use. Patient has been educated on the risks, benefits, and alternatives of controlled substances as well as the proper way to store these medications.   The patient and I discussed the nature of this medication and its side effects. We discussed tolerance, physical dependence, psychological dependence, addiction and opioid-induced hyperalgesia  Uds reviewed and compliant.  ]      Duration:  [ 2 months ]      Intervention:  [ Surgeons are working up for treatments but worker comp delaying care due to approval paper work  ]         Vito Chaves,  KEYUR 04/22/24 11:02 AM

## 2024-04-29 ENCOUNTER — TELEPHONE (OUTPATIENT)
Dept: PAIN MEDICINE | Facility: CLINIC | Age: 64
End: 2024-04-29

## 2024-04-29 NOTE — TELEPHONE ENCOUNTER
Pt called in regards to percocet medication. Stated that pharmacy refused to fill 4/27 because they had a note in the order to fill it for June, which I could not find. I called [facility] and it seems that the patient misunderstood. Pharmacy was only able to give him a partial script of 16/110 pills due to being out of stock and told patient to call them back in the afternoon to see if there are any updates on the med order. I called back the patient to clarify the situation and he expressed understanding. I told him to call back if there are any issues.

## 2024-06-24 ENCOUNTER — OFFICE VISIT (OUTPATIENT)
Dept: PAIN MEDICINE | Facility: CLINIC | Age: 64
End: 2024-06-24
Payer: COMMERCIAL

## 2024-06-24 VITALS
SYSTOLIC BLOOD PRESSURE: 144 MMHG | OXYGEN SATURATION: 96 % | RESPIRATION RATE: 16 BRPM | WEIGHT: 151 LBS | HEART RATE: 86 BPM | BODY MASS INDEX: 22.88 KG/M2 | HEIGHT: 68 IN | DIASTOLIC BLOOD PRESSURE: 84 MMHG

## 2024-06-24 DIAGNOSIS — M96.1 POSTLAMINECTOMY SYNDROME OF LUMBAR REGION: ICD-10-CM

## 2024-06-24 PROCEDURE — 99214 OFFICE O/P EST MOD 30 MIN: CPT | Performed by: PHYSICIAN ASSISTANT

## 2024-06-24 RX ORDER — OXYCODONE AND ACETAMINOPHEN 10; 325 MG/1; MG/1
1 TABLET ORAL EVERY 6 HOURS PRN
Qty: 110 TABLET | Refills: 0 | Status: SHIPPED | OUTPATIENT
Start: 2024-06-24

## 2024-06-24 ASSESSMENT — ENCOUNTER SYMPTOMS
FATIGUE: 0
PALPITATIONS: 0
FEVER: 0
WHEEZING: 0
VOMITING: 0
SLEEP DISTURBANCE: 0
ACTIVITY CHANGE: 0
NAUSEA: 0
SHORTNESS OF BREATH: 0
UNEXPECTED WEIGHT CHANGE: 0
BACK PAIN: 1
DIARRHEA: 0
VOICE CHANGE: 0
CHILLS: 0
COUGH: 0

## 2024-06-24 ASSESSMENT — PAIN - FUNCTIONAL ASSESSMENT: PAIN_FUNCTIONAL_ASSESSMENT: 0-10

## 2024-06-24 ASSESSMENT — LIFESTYLE VARIABLES: TOTAL SCORE: 0

## 2024-06-24 ASSESSMENT — PATIENT HEALTH QUESTIONNAIRE - PHQ9
SUM OF ALL RESPONSES TO PHQ9 QUESTIONS 1 AND 2: 0
1. LITTLE INTEREST OR PLEASURE IN DOING THINGS: NOT AT ALL
2. FEELING DOWN, DEPRESSED OR HOPELESS: NOT AT ALL

## 2024-06-24 ASSESSMENT — PAIN SCALES - GENERAL: PAINLEVEL_OUTOF10: 8

## 2024-06-24 ASSESSMENT — PAIN DESCRIPTION - DESCRIPTORS: DESCRIPTORS: ACHING

## 2024-06-24 NOTE — PROGRESS NOTES
MEDICATION NAME: percocet  STRENGTH: 10/325mg  LAST FILL DATE: 24  DATE LAST TAKEN: 24  QUANTITY FILLED: 110  QUANTITY REMAININ  COUNT COMPLETED BY: PETER ENGEL RN and JOAN ELKINS    UDS LAST COMPLETED:2024  CONTROLLED SUBSTANCES AGREEMENT LAST SIGNED: 2024  ORT LAST COMPLETED:2024  Modified [employer] disability form filled out annually.

## 2024-06-24 NOTE — PROGRESS NOTES
Subjective   Patient ID: Sarah Mg is a 63 y.o. male who presents for Back Pain.  Patient is a 63-year-old male with a Worker's Comp. related injury that presents today for follow-up.  Patient states him and his new newlywed I purchased a new house and removing this has been causing exacerbation of his symptoms.  He has been trying to be smart about lifting and both technique and weight he does report he has to be out of his old apartment by the end of the month.  He states that his POR and  have been talking about additional conditions and allowances but there is been no update as of yet.    Back Pain  Pertinent negatives include no chest pain or fever.       Review of Systems   Constitutional:  Negative for activity change, chills, fatigue, fever and unexpected weight change.   HENT:  Negative for ear pain and voice change.    Eyes:  Negative for visual disturbance.   Respiratory:  Negative for cough, shortness of breath and wheezing.    Cardiovascular:  Negative for chest pain and palpitations.   Gastrointestinal:  Negative for diarrhea, nausea and vomiting.   Musculoskeletal:  Positive for back pain and gait problem.   Psychiatric/Behavioral:  Negative for behavioral problems, self-injury, sleep disturbance and suicidal ideas.        Objective   Physical Exam  Vitals reviewed.   Constitutional:       Appearance: Normal appearance.   HENT:      Head: Normocephalic and atraumatic.      Mouth/Throat:      Mouth: Mucous membranes are moist.   Neck:      Vascular: No JVD.   Pulmonary:      Effort: Pulmonary effort is normal. No tachypnea or bradypnea.   Abdominal:      Palpations: Abdomen is soft.   Musculoskeletal:      Lumbar back: Spasms and tenderness present. Positive left straight leg raise test. Negative right straight leg raise test.      Comments: STR WNL, hypo sensation lateral lle thigh. DTR = BLE, antalgic gait.    Skin:     General: Skin is warm and dry.   Neurological:      Mental Status: He  is alert and oriented to person, place, and time.   Psychiatric:         Mood and Affect: Mood normal.         Behavior: Behavior normal. Behavior is cooperative.       Assessment/Plan   Problem List Items Addressed This Visit             ICD-10-CM    Postlaminectomy syndrome of lumbar region M96.1     I had nice discussion with the patient today our plan will be as follows.      Radiology: [ none at this time ]      Physically:  [ continue modification of activities, healthy lifestyle choice ]      Psychologically:  [ No acute psychological concerns ]      Medication: [I will refill the medications at the same dose and frequency. We will continue to monitor the patient bimonthly for compliance, adverse reaction or interactions The patient continues to see benefit and improvement in their quality of life and ability to maintain ADLs. Patient educated about the risks of taking opioids and operating a motor vehicle. Patient reports no adverse side effects to current medication regimen. Current regimen does allow patient to maintain ADLs. Oarrs has been reviewed. No suspicion of diversion or abuse. Compliance with medication regime, no use of illicit drugs, no sharing of narcotic medications with others, do not use others narcotic medication, and to avoid alcohol use. Patient has been educated on the risks, benefits, and alternatives of controlled substances as well as the proper way to store these medications.   The patient and I discussed the nature of this medication and its side effects. We discussed tolerance, physical dependence, psychological dependence, addiction and opioid-induced hyperalgesia ]      Duration:  [ 2 months ]      Intervention:  [ none at this time. Patient is stable.  ]         Vito Chaves PA-C 06/24/24 11:35 AM

## 2024-06-25 ENCOUNTER — TELEPHONE (OUTPATIENT)
Dept: PAIN MEDICINE | Facility: CLINIC | Age: 64
End: 2024-06-25

## 2024-06-25 DIAGNOSIS — M96.1 POSTLAMINECTOMY SYNDROME OF LUMBAR REGION: ICD-10-CM

## 2024-06-25 RX ORDER — CYCLOBENZAPRINE HCL 5 MG
5 TABLET ORAL NIGHTLY PRN
Qty: 30 TABLET | Refills: 1 | Status: CANCELLED | OUTPATIENT
Start: 2024-06-25 | End: 2024-08-24

## 2024-06-25 RX ORDER — CYCLOBENZAPRINE HCL 10 MG
10 TABLET ORAL 3 TIMES DAILY PRN
Qty: 21 TABLET | Refills: 2 | Status: SHIPPED | OUTPATIENT
Start: 2024-06-25 | End: 2024-07-16

## 2024-07-23 ENCOUNTER — TELEPHONE (OUTPATIENT)
Dept: PAIN MEDICINE | Facility: CLINIC | Age: 64
End: 2024-07-23
Payer: COMMERCIAL

## 2024-07-23 DIAGNOSIS — M96.1 POSTLAMINECTOMY SYNDROME OF LUMBAR REGION: ICD-10-CM

## 2024-07-23 NOTE — TELEPHONE ENCOUNTER
Pt called to say that [facility] has closed and he will need his RX of Percocet sent to [facility] for his 7/27/24 fill date.

## 2024-07-24 RX ORDER — OXYCODONE AND ACETAMINOPHEN 10; 325 MG/1; MG/1
1 TABLET ORAL EVERY 6 HOURS PRN
Qty: 110 TABLET | Refills: 0 | Status: SHIPPED | OUTPATIENT
Start: 2024-07-24

## 2024-08-16 ENCOUNTER — OFFICE VISIT (OUTPATIENT)
Dept: PAIN MEDICINE | Facility: CLINIC | Age: 64
End: 2024-08-16
Payer: COMMERCIAL

## 2024-08-16 VITALS
HEART RATE: 65 BPM | SYSTOLIC BLOOD PRESSURE: 120 MMHG | WEIGHT: 151 LBS | RESPIRATION RATE: 18 BRPM | HEIGHT: 68 IN | BODY MASS INDEX: 22.88 KG/M2 | DIASTOLIC BLOOD PRESSURE: 78 MMHG | OXYGEN SATURATION: 97 %

## 2024-08-16 DIAGNOSIS — M96.1 POSTLAMINECTOMY SYNDROME OF LUMBAR REGION: ICD-10-CM

## 2024-08-16 DIAGNOSIS — Z79.899 HISTORY OF ONGOING TREATMENT WITH HIGH-RISK MEDICATION: Primary | ICD-10-CM

## 2024-08-16 PROCEDURE — 99214 OFFICE O/P EST MOD 30 MIN: CPT | Performed by: PHYSICIAN ASSISTANT

## 2024-08-16 PROCEDURE — 80307 DRUG TEST PRSMV CHEM ANLYZR: CPT | Mod: WESLAB | Performed by: PHYSICIAN ASSISTANT

## 2024-08-16 RX ORDER — OXYCODONE AND ACETAMINOPHEN 10; 325 MG/1; MG/1
1 TABLET ORAL EVERY 6 HOURS PRN
Qty: 110 TABLET | Refills: 0 | Status: SHIPPED | OUTPATIENT
Start: 2024-08-16 | End: 2024-08-16 | Stop reason: WASHOUT

## 2024-08-16 RX ORDER — CYCLOBENZAPRINE HCL 10 MG
10 TABLET ORAL 3 TIMES DAILY PRN
Qty: 21 TABLET | Refills: 2 | Status: SHIPPED | OUTPATIENT
Start: 2024-08-16 | End: 2024-09-06

## 2024-08-16 RX ORDER — OXYCODONE AND ACETAMINOPHEN 10; 325 MG/1; MG/1
1 TABLET ORAL EVERY 6 HOURS PRN
Qty: 120 TABLET | Refills: 0 | Status: SHIPPED | OUTPATIENT
Start: 2024-08-16 | End: 2024-09-15

## 2024-08-16 ASSESSMENT — PATIENT HEALTH QUESTIONNAIRE - PHQ9
1. LITTLE INTEREST OR PLEASURE IN DOING THINGS: NOT AT ALL
2. FEELING DOWN, DEPRESSED OR HOPELESS: NOT AT ALL
SUM OF ALL RESPONSES TO PHQ9 QUESTIONS 1 & 2: 0

## 2024-08-16 ASSESSMENT — ENCOUNTER SYMPTOMS
SHORTNESS OF BREATH: 0
PAIN: 1
FATIGUE: 0
ACTIVITY CHANGE: 0
VOICE CHANGE: 0
VOMITING: 0
DIARRHEA: 0
BACK PAIN: 1
NAUSEA: 0
FEVER: 0
SLEEP DISTURBANCE: 0
WHEEZING: 0
CHILLS: 0
COUGH: 0
PALPITATIONS: 0
UNEXPECTED WEIGHT CHANGE: 0

## 2024-08-16 ASSESSMENT — LIFESTYLE VARIABLES
SKIP TO QUESTIONS 9-10: 1
HOW OFTEN DO YOU HAVE SIX OR MORE DRINKS ON ONE OCCASION: NEVER
HOW MANY STANDARD DRINKS CONTAINING ALCOHOL DO YOU HAVE ON A TYPICAL DAY: PATIENT DOES NOT DRINK
HOW OFTEN DO YOU HAVE A DRINK CONTAINING ALCOHOL: NEVER
AUDIT-C TOTAL SCORE: 0

## 2024-08-16 ASSESSMENT — PAIN SCALES - GENERAL
PAINLEVEL_OUTOF10: 6
PAINLEVEL: 6

## 2024-08-16 ASSESSMENT — PAIN - FUNCTIONAL ASSESSMENT: PAIN_FUNCTIONAL_ASSESSMENT: 0-10

## 2024-08-16 NOTE — PROGRESS NOTES
MEDICATION NAME: Oxycodone   STRENGTH:   LAST FILL DATE: 24  DATE LAST TAKEN: 8/15/24  QUANTITY FILLED: 110  QUANTITY REMAININ  COUNT COMPLETED BY: JOAN ELKINS and SAMY Magana      UDS LAST COMPLETED:   CONTROLLED SUBSTANCES AGREEMENT LAST SIGNED:   ORT LAST COMPLETED:  Modified [employer] disability form filled out annually.

## 2024-08-16 NOTE — PROGRESS NOTES
Subjective   Patient ID: Sarah Mg is a 63 y.o. male who presents for Pain.  Is a 63-year-old male with a Worker's Comp. related injury the presents today for follow-up.  Patient denotes that his move has caused increase in aggravation in his symptoms.  He has utilized additional muscle relaxers and he is currently out.  He is requesting a refill of those.  And his narcotic medication.  Patient has been trying to pace himself and he does still have the remainder of boxes and moving in that he is not completed.  Patient does note that his Worker's Comp. claim has not offered any new changes allowances or information.    Pain  Pertinent negatives include no chest pain, diarrhea, fatigue, fever, nausea, shortness of breath, vomiting or wheezing.       Review of Systems   Constitutional:  Negative for activity change, chills, fatigue, fever and unexpected weight change.   HENT:  Negative for ear pain and voice change.    Eyes:  Negative for visual disturbance.   Respiratory:  Negative for cough, shortness of breath and wheezing.    Cardiovascular:  Negative for chest pain and palpitations.   Gastrointestinal:  Negative for diarrhea, nausea and vomiting.   Musculoskeletal:  Positive for back pain and gait problem.   Psychiatric/Behavioral:  Negative for behavioral problems, self-injury, sleep disturbance and suicidal ideas.        Objective   Physical Exam  Vitals reviewed.   Constitutional:       Appearance: Normal appearance.   HENT:      Head: Normocephalic and atraumatic.      Mouth/Throat:      Mouth: Mucous membranes are moist.   Neck:      Vascular: No JVD.   Pulmonary:      Effort: Pulmonary effort is normal. No tachypnea or bradypnea.   Abdominal:      Palpations: Abdomen is soft.   Musculoskeletal:      Lumbar back: Spasms and tenderness present. Positive left straight leg raise test. Negative right straight leg raise test.      Comments: STR WNL, hypo sensation lateral lle thigh. DTR = BLE, antalgic gait.     Skin:     General: Skin is warm and dry.   Neurological:      Mental Status: He is alert and oriented to person, place, and time.   Psychiatric:         Mood and Affect: Mood normal.         Behavior: Behavior normal. Behavior is cooperative.         Assessment/Plan   Problem List Items Addressed This Visit             ICD-10-CM    Postlaminectomy syndrome of lumbar region M96.1    Relevant Medications    oxyCODONE-acetaminophen (Percocet)  mg tablet    oxyCODONE-acetaminophen (Percocet)  mg tablet    cyclobenzaprine (Flexeril) 10 mg tablet    History of ongoing treatment with high-risk medication - Primary Z79.899    Relevant Orders    Opiate/Opioid/Benzo Prescription Compliance    OOB Internal Tracking   I had nice discussion with the patient today our plan will be as follows.      Radiology: [ none at this time ]      Physically:  [ continue modification of activities, healthy lifestyle choice ]      Psychologically:  [ No acute psychological concerns ]      Medication: [2 months increase patient to a full 120.  Will continue to titrate down after the acute exacerbation. We will continue to monitor the patient bimonthly for compliance, adverse reaction or interactions The patient continues to see benefit and improvement in their quality of life and ability to maintain ADLs. Patient educated about the risks of taking opioids and operating a motor vehicle. Patient reports no adverse side effects to current medication regimen. Current regimen does allow patient to maintain ADLs. Oarrs has been reviewed. No suspicion of diversion or abuse. Compliance with medication regime, no use of illicit drugs, no sharing of narcotic medications with others, do not use others narcotic medication, and to avoid alcohol use. Patient has been educated on the risks, benefits, and alternatives of controlled substances as well as the proper way to store these medications.   The patient and I discussed the nature of this  medication and its side effects. We discussed tolerance, physical dependence, psychological dependence, addiction and opioid-induced hyperalgesia   Patient is submit tox screen]      Duration:  [ 2 months ]      Intervention:  [ none at this time. Patient is stable.  ]           Vito Chaves PA-C 08/16/24 11:09 AM

## 2024-08-17 LAB
AMPHETAMINES UR QL SCN: NORMAL
BARBITURATES UR QL SCN: NORMAL
BZE UR QL SCN: NORMAL
CANNABINOIDS UR QL SCN: NORMAL
CREAT UR-MCNC: 186 MG/DL (ref 20–370)
PCP UR QL SCN: NORMAL

## 2024-08-21 LAB

## 2024-09-16 DIAGNOSIS — M46.1 INFLAMMATION OF RIGHT SACROILIAC JOINT (CMS-HCC): Primary | ICD-10-CM

## 2024-09-16 RX ORDER — SODIUM CHLORIDE, SODIUM LACTATE, POTASSIUM CHLORIDE, CALCIUM CHLORIDE 600; 310; 30; 20 MG/100ML; MG/100ML; MG/100ML; MG/100ML
20 INJECTION, SOLUTION INTRAVENOUS CONTINUOUS
OUTPATIENT
Start: 2024-09-16

## 2024-10-02 ENCOUNTER — ANCILLARY PROCEDURE (OUTPATIENT)
Dept: RADIOLOGY | Facility: EXTERNAL LOCATION | Age: 64
End: 2024-10-02
Payer: COMMERCIAL

## 2024-10-02 DIAGNOSIS — M46.1 SACROILIITIS, NOT ELSEWHERE CLASSIFIED (CMS-HCC): ICD-10-CM

## 2024-10-02 NOTE — PROGRESS NOTES
Preprocedure diagnosis: Sacroiliitis  Postprocedure diagnosis sacroiliitis    Procedure performed: Right sacroiliac joint injection under fluoroscopic guidance    Physician: Arpit Maldonado MD    Anesthesia: Local    Complications: none    Blood loss:  none    Clinical note: This is a very pleasant 63-year-old male who suffers with right low back pain here meeting all medical criteria for above-mentioned procedure.    Procedure:      The patient was identified in the preoperative area.  The procedure was discussed in detail including its risks, benefits, and alternatives.  Signed consent was obtained and the patient agreed to proceed.       The patient was brought to the [facility] procedure room and positioned herself in the prone position onto the procedure room table.  A pillow was placed below the patient's abdomen to decrease lumbar lordosis and a safety strap was placed across the legs.  Next the right sacroiliac joint/s were identified with the aid of fluoroscopy in the anterior-posterior view.  The patient was prepped and draped in the usual sterile fashion with Chlorprep. The skin and subcutaneous tissues were infiltrated with 3 ml of 2% Lidocaine using a 25 gauge 1 1/2 inch needle on each side. A 22 gauge spinal needle was advanced with the aid of fluoroscopy in the anterior-posterior view into the inferior posterior pole of the right sacroiliac joint/s. Needle placement was confirmed with the aid of fluoroscopy in the anterior-posterior view. After negative aspiration, 0.5ml of omnipaque contrast was injected under live fluoroscopy into each needle which showed appropriate spread. The right SI joint/s was delineated with the aid of fluoroscopy in the anterior-posterior view.     After negative aspiration, 4ml of 0.5% bupivacaine along with 40mg of triamcinolone was injected into each needle     All needles were removed intact. Hemostasis was maintained, and there were no paresthesias or  complications noted. The area was cleaned and a bandage was applied as appropriate.  The patient was then transferred to the recovery area. The procedure was completed without complications and was tolerated well. The patient was monitored after the procedure. The patient (or responsible party) was given post-procedure and discharge instructions to follow at home. The patient was discharged in stable condition. A follow up appointment was made.          Arpit Maldonado MD

## 2024-10-24 ENCOUNTER — OFFICE VISIT (OUTPATIENT)
Dept: PAIN MEDICINE | Facility: CLINIC | Age: 64
End: 2024-10-24
Payer: COMMERCIAL

## 2024-10-24 VITALS
WEIGHT: 151 LBS | BODY MASS INDEX: 22.88 KG/M2 | SYSTOLIC BLOOD PRESSURE: 142 MMHG | OXYGEN SATURATION: 100 % | HEIGHT: 68 IN | RESPIRATION RATE: 18 BRPM | DIASTOLIC BLOOD PRESSURE: 92 MMHG | HEART RATE: 79 BPM

## 2024-10-24 DIAGNOSIS — M96.1 POSTLAMINECTOMY SYNDROME OF LUMBAR REGION: Primary | ICD-10-CM

## 2024-10-24 DIAGNOSIS — M46.1 INFLAMMATION OF RIGHT SACROILIAC JOINT (CMS-HCC): ICD-10-CM

## 2024-10-24 PROCEDURE — 99214 OFFICE O/P EST MOD 30 MIN: CPT | Performed by: PHYSICIAN ASSISTANT

## 2024-10-24 RX ORDER — CYCLOBENZAPRINE HCL 10 MG
10 TABLET ORAL 3 TIMES DAILY PRN
Qty: 21 TABLET | Refills: 2 | Status: SHIPPED | OUTPATIENT
Start: 2024-10-24 | End: 2024-11-14

## 2024-10-24 RX ORDER — OXYCODONE AND ACETAMINOPHEN 10; 325 MG/1; MG/1
1 TABLET ORAL EVERY 6 HOURS PRN
Qty: 110 TABLET | Refills: 0 | Status: SHIPPED | OUTPATIENT
Start: 2024-11-25 | End: 2024-12-25

## 2024-10-24 RX ORDER — OXYCODONE AND ACETAMINOPHEN 10; 325 MG/1; MG/1
1 TABLET ORAL EVERY 6 HOURS PRN
Qty: 110 TABLET | Refills: 0 | Status: SHIPPED | OUTPATIENT
Start: 2024-10-26 | End: 2024-11-25

## 2024-10-24 RX ORDER — OXYCODONE AND ACETAMINOPHEN 10; 325 MG/1; MG/1
1 TABLET ORAL EVERY 6 HOURS PRN
COMMUNITY
Start: 2024-09-26 | End: 2024-10-24 | Stop reason: SDUPTHER

## 2024-10-24 ASSESSMENT — ENCOUNTER SYMPTOMS
SLEEP DISTURBANCE: 0
CHILLS: 0
NAUSEA: 0
COUGH: 0
VOICE CHANGE: 0
ACTIVITY CHANGE: 0
WHEEZING: 0
PALPITATIONS: 0
BACK PAIN: 1
DIARRHEA: 0
UNEXPECTED WEIGHT CHANGE: 0
SHORTNESS OF BREATH: 0
PAIN: 1
FATIGUE: 0
VOMITING: 0
FEVER: 0

## 2024-10-24 ASSESSMENT — LIFESTYLE VARIABLES
AUDIT-C TOTAL SCORE: 0
HOW OFTEN DO YOU HAVE A DRINK CONTAINING ALCOHOL: NEVER
HOW MANY STANDARD DRINKS CONTAINING ALCOHOL DO YOU HAVE ON A TYPICAL DAY: PATIENT DOES NOT DRINK
HOW OFTEN DO YOU HAVE SIX OR MORE DRINKS ON ONE OCCASION: NEVER
SKIP TO QUESTIONS 9-10: 1

## 2024-10-24 ASSESSMENT — PAIN - FUNCTIONAL ASSESSMENT: PAIN_FUNCTIONAL_ASSESSMENT: 0-10

## 2024-10-24 ASSESSMENT — PAIN SCALES - GENERAL
PAINLEVEL_OUTOF10: 7
PAINLEVEL_OUTOF10: 7

## 2024-10-24 ASSESSMENT — PATIENT HEALTH QUESTIONNAIRE - PHQ9
SUM OF ALL RESPONSES TO PHQ9 QUESTIONS 1 & 2: 0
1. LITTLE INTEREST OR PLEASURE IN DOING THINGS: NOT AT ALL
2. FEELING DOWN, DEPRESSED OR HOPELESS: NOT AT ALL

## 2024-10-24 NOTE — PROGRESS NOTES
Subjective   Patient ID: Sarah Mg is a 63 y.o. male who presents for Pain.  Patient is a 60-year-old male with a Worker's Comp. related injury that presents today for follow-up.  Patient had an SI joint injection.  Patient did notes that this procedure was traumatic and he did never want to do it again he had nearly 100% relief after this procedure for approximately 3 days.    Pain  Pertinent negatives include no chest pain, diarrhea, fatigue, fever, nausea, shortness of breath, vomiting or wheezing.       Review of Systems   Constitutional:  Negative for activity change, chills, fatigue, fever and unexpected weight change.   HENT:  Negative for ear pain and voice change.    Eyes:  Negative for visual disturbance.   Respiratory:  Negative for cough, shortness of breath and wheezing.    Cardiovascular:  Negative for chest pain and palpitations.   Gastrointestinal:  Negative for diarrhea, nausea and vomiting.   Musculoskeletal:  Positive for back pain and gait problem.   Psychiatric/Behavioral:  Negative for behavioral problems, self-injury, sleep disturbance and suicidal ideas.        Objective   Physical Exam  Vitals reviewed.   Constitutional:       Appearance: Normal appearance.   HENT:      Head: Normocephalic and atraumatic.      Mouth/Throat:      Mouth: Mucous membranes are moist.   Neck:      Vascular: No JVD.   Pulmonary:      Effort: Pulmonary effort is normal. No tachypnea or bradypnea.   Abdominal:      Palpations: Abdomen is soft.   Musculoskeletal:      Lumbar back: Spasms and tenderness present. Positive left straight leg raise test. Negative right straight leg raise test.      Comments: STR WNL, hypo sensation lateral lle thigh. DTR = BLE, antalgic gait.    Skin:     General: Skin is warm and dry.   Neurological:      Mental Status: He is alert and oriented to person, place, and time.   Psychiatric:         Mood and Affect: Mood normal.         Behavior: Behavior normal. Behavior is cooperative.        Assessment/Plan   Problem List Items Addressed This Visit             ICD-10-CM    Postlaminectomy syndrome of lumbar region - Primary M96.1    Relevant Medications    oxyCODONE-acetaminophen (Percocet)  mg tablet (Start on 11/25/2024)    oxyCODONE-acetaminophen (Percocet)  mg tablet (Start on 10/26/2024)    cyclobenzaprine (Flexeril) 10 mg tablet    Inflammation of right sacroiliac joint (CMS-HCC) M46.1   I had nice discussion with the patient today our plan will be as follows.      Radiology: [ none at this time ]      Physically:  [ continue modification of activities, healthy lifestyle choice ]      Psychologically:  [ No acute psychological concerns. There are no mental health issues of which I am aware that are contributing to the patient's pain. There are no substance abuse or alcohol abuse issues of which I am aware that are contributing to the patient's pain. ]      Medication: [I will refill the medications at the same dose and frequency. We will continue to monitor the patient bimonthly for compliance, adverse reaction or interactions The patient continues to see benefit and improvement in their quality of life and ability to maintain ADLs. Patient educated about the risks of taking opioids and operating a motor vehicle. Patient reports no adverse side effects to current medication regimen. Current regimen does allow patient to maintain ADLs. Oarrs has been reviewed. No suspicion of diversion or abuse. Compliance with medication regime, no use of illicit drugs, no sharing of narcotic medications with others, do not use others narcotic medication, and to avoid alcohol use. Patient has been educated on the risks, benefits, and alternatives of controlled substances as well as the proper way to store these medications.   The patient and I discussed the nature of this medication and its side effects. We discussed tolerance, physical dependence, psychological dependence, addiction and  opioid-induced hyperalgesia ]  UDS reviewed and compliant.      Duration:  [ 2 months ]      Intervention:  [ none at this time. Patient is stable.   ]           Vito Chaves PA-C 10/24/24 11:27 AM

## 2024-10-24 NOTE — PROGRESS NOTES
MEDICATION NAME: Percocet   STRENGTH:   LAST FILL DATE: 24  DATE LAST TAKEN: 10/23/24  QUANTITY FILLED: 120  QUANTITY REMAININ  COUNT COMPLETED BY: JOAN ELKINS and SAMY SAHNI      UDS LAST COMPLETED:   CONTROLLED SUBSTANCES AGREEMENT LAST SIGNED:   ORT LAST COMPLETED:  Modified [employer] disability form filled out annually.

## 2024-12-23 ENCOUNTER — OFFICE VISIT (OUTPATIENT)
Dept: PAIN MEDICINE | Facility: CLINIC | Age: 64
End: 2024-12-23
Payer: COMMERCIAL

## 2024-12-23 VITALS
HEIGHT: 68 IN | SYSTOLIC BLOOD PRESSURE: 126 MMHG | HEART RATE: 65 BPM | RESPIRATION RATE: 18 BRPM | WEIGHT: 151 LBS | BODY MASS INDEX: 22.88 KG/M2 | OXYGEN SATURATION: 95 % | DIASTOLIC BLOOD PRESSURE: 78 MMHG

## 2024-12-23 DIAGNOSIS — M96.1 POSTLAMINECTOMY SYNDROME OF LUMBAR REGION: ICD-10-CM

## 2024-12-23 PROCEDURE — 99214 OFFICE O/P EST MOD 30 MIN: CPT | Performed by: PHYSICIAN ASSISTANT

## 2024-12-23 RX ORDER — CYCLOBENZAPRINE HCL 10 MG
10 TABLET ORAL 3 TIMES DAILY PRN
Qty: 21 TABLET | Refills: 2 | Status: SHIPPED | OUTPATIENT
Start: 2024-12-23 | End: 2025-01-13

## 2024-12-23 RX ORDER — OXYCODONE AND ACETAMINOPHEN 10; 325 MG/1; MG/1
1 TABLET ORAL EVERY 6 HOURS PRN
Qty: 110 TABLET | Refills: 0 | Status: SHIPPED | OUTPATIENT
Start: 2025-01-24 | End: 2025-02-23

## 2024-12-23 RX ORDER — OXYCODONE AND ACETAMINOPHEN 10; 325 MG/1; MG/1
1 TABLET ORAL EVERY 6 HOURS PRN
Qty: 110 TABLET | Refills: 0 | Status: SHIPPED | OUTPATIENT
Start: 2024-12-24 | End: 2025-01-23

## 2024-12-23 ASSESSMENT — ENCOUNTER SYMPTOMS
FEVER: 0
COUGH: 0
PALPITATIONS: 0
BACK PAIN: 1
NAUSEA: 0
PAIN: 1
CHILLS: 0
ACTIVITY CHANGE: 0
UNEXPECTED WEIGHT CHANGE: 0
WHEEZING: 0
VOMITING: 0
SLEEP DISTURBANCE: 0
FATIGUE: 0
DIARRHEA: 0
SHORTNESS OF BREATH: 0
VOICE CHANGE: 0

## 2024-12-23 ASSESSMENT — LIFESTYLE VARIABLES
HOW OFTEN DO YOU HAVE SIX OR MORE DRINKS ON ONE OCCASION: NEVER
HOW OFTEN DO YOU HAVE A DRINK CONTAINING ALCOHOL: NEVER
AUDIT-C TOTAL SCORE: 0
HOW MANY STANDARD DRINKS CONTAINING ALCOHOL DO YOU HAVE ON A TYPICAL DAY: PATIENT DOES NOT DRINK
SKIP TO QUESTIONS 9-10: 1

## 2024-12-23 ASSESSMENT — PAIN - FUNCTIONAL ASSESSMENT: PAIN_FUNCTIONAL_ASSESSMENT: 0-10

## 2024-12-23 ASSESSMENT — PAIN SCALES - GENERAL
PAINLEVEL_OUTOF10: 5 - MODERATE PAIN
PAINLEVEL_OUTOF10: 5

## 2024-12-23 ASSESSMENT — PATIENT HEALTH QUESTIONNAIRE - PHQ9
2. FEELING DOWN, DEPRESSED OR HOPELESS: NOT AT ALL
1. LITTLE INTEREST OR PLEASURE IN DOING THINGS: NOT AT ALL
SUM OF ALL RESPONSES TO PHQ9 QUESTIONS 1 & 2: 0

## 2024-12-23 ASSESSMENT — PAIN DESCRIPTION - DESCRIPTORS: DESCRIPTORS: ACHING

## 2024-12-23 NOTE — PROGRESS NOTES
Subjective   Patient ID: Sarah Mg is a 64 y.o. male who presents for Pain.  Patient is a 64-year-old male with postlaminectomy syndrome presents today for a Worker's Comp. follow-up.  Apparently patient's diligence in conjunction with his providers have gotten approved Worker's Comp. physical therapy order.  This is for maintenance and general exercise.  Looks like this will be completed per his report about once a month. pt is hoping that this will maintain his level of function.     Pain  Pertinent negatives include no chest pain, diarrhea, fatigue, fever, nausea, shortness of breath, vomiting or wheezing.       Review of Systems   Constitutional:  Negative for activity change, chills, fatigue, fever and unexpected weight change.   HENT:  Negative for ear pain and voice change.    Eyes:  Negative for visual disturbance.   Respiratory:  Negative for cough, shortness of breath and wheezing.    Cardiovascular:  Negative for chest pain and palpitations.   Gastrointestinal:  Negative for diarrhea, nausea and vomiting.   Musculoskeletal:  Positive for back pain and gait problem.   Psychiatric/Behavioral:  Negative for behavioral problems, self-injury, sleep disturbance and suicidal ideas.        Objective   Physical Exam  Vitals reviewed.   Constitutional:       Appearance: Normal appearance.   HENT:      Head: Normocephalic and atraumatic.      Mouth/Throat:      Mouth: Mucous membranes are moist.   Neck:      Vascular: No JVD.   Pulmonary:      Effort: Pulmonary effort is normal. No tachypnea or bradypnea.   Abdominal:      Palpations: Abdomen is soft.   Musculoskeletal:      Lumbar back: Spasms and tenderness present. Positive left straight leg raise test. Negative right straight leg raise test.      Comments: STR WNL, hypo sensation lateral lle thigh. DTR = BLE, antalgic gait.    Skin:     General: Skin is warm and dry.   Neurological:      Mental Status: He is alert and oriented to person, place, and time.    Psychiatric:         Mood and Affect: Mood normal.         Behavior: Behavior normal. Behavior is cooperative.       Assessment/Plan   Problem List Items Addressed This Visit             ICD-10-CM    Postlaminectomy syndrome of lumbar region M96.1     I had nice discussion with the patient today our plan will be as follows.      Radiology: [ none at this time ]      Physically:  [ continue modification of activities, healthy lifestyle choice, start approved PT. ]      Psychologically:  [ No acute psychological concerns. There are no mental health issues of which I am aware that are contributing to the patient's pain. There are no substance abuse or alcohol abuse issues of which I am aware that are contributing to the patient's pain. ]      Medication: [ I will refill the patient's opioids today for 2 month.  The patient continues to see benefit and improvement in their quality of life and ability to maintain ADLs.  Patient educated about the risks of taking opioids and operating a motor vehicle.  Patient reports no adverse side effects to current medication regimen.  Current regimen does allow patient to maintain ADLs.  Patient reports no new neurologic symptoms, new pain areas, or exacerbation in pain today.  Patient reports they are happy with current treatment care path.    OARRS was reviewed and was consistent with the history.    Patient has been educated on the risks, benefits, and alternatives of controlled substances as well as the proper way to store these medications.  The patient and I discussed the nature of this medication and its side effects.  We discussed tolerance, physical dependence, psychological dependence, addiction and opioid-induced hyperalgesia.  We discussed the potential need to wean from this medication.  We discussed the availability of programs that can help with this process if necessary.  We discussed safety issues related to opioids including safe storage.  We discussed the fact  that the patient should not drive an automobile or operate heavy machinery while taking this medication.  A prescription for naloxone was offered to the patient.  The patient will be re-evaluated for the need to continue opioid therapy in 60 days. ]      Duration:  [ 2 months ]      Intervention:  [ none at this time. Patient is stable.  ]         Vito Chaves PA-C 12/23/24 10:20 AM

## 2024-12-23 NOTE — PROGRESS NOTES
MEDICATION NAME: Percocet   STRENGTH:   LAST FILL DATE: 24  DATE LAST TAKEN: 24  QUANTITY FILLED: 110  QUANTITY REMAININ  COUNT COMPLETED BY: JOAN ELKINS and SAMY SAHNI      UDS LAST COMPLETED:   CONTROLLED SUBSTANCES AGREEMENT LAST SIGNED:   ORT LAST COMPLETED:  Modified [employer] disability form filled out annually.

## 2025-02-20 ENCOUNTER — OFFICE VISIT (OUTPATIENT)
Dept: PAIN MEDICINE | Facility: CLINIC | Age: 65
End: 2025-02-20
Payer: COMMERCIAL

## 2025-02-20 VITALS
HEIGHT: 68 IN | HEART RATE: 68 BPM | SYSTOLIC BLOOD PRESSURE: 129 MMHG | OXYGEN SATURATION: 97 % | RESPIRATION RATE: 18 BRPM | BODY MASS INDEX: 22.88 KG/M2 | WEIGHT: 151 LBS | DIASTOLIC BLOOD PRESSURE: 77 MMHG

## 2025-02-20 DIAGNOSIS — Z79.899 HISTORY OF ONGOING TREATMENT WITH HIGH-RISK MEDICATION: Primary | ICD-10-CM

## 2025-02-20 DIAGNOSIS — M96.1 POSTLAMINECTOMY SYNDROME OF LUMBAR REGION: ICD-10-CM

## 2025-02-20 PROCEDURE — 99214 OFFICE O/P EST MOD 30 MIN: CPT | Performed by: PHYSICIAN ASSISTANT

## 2025-02-20 RX ORDER — OXYCODONE AND ACETAMINOPHEN 10; 325 MG/1; MG/1
1 TABLET ORAL EVERY 6 HOURS PRN
Qty: 110 TABLET | Refills: 0 | Status: SHIPPED | OUTPATIENT
Start: 2025-03-30 | End: 2025-04-29

## 2025-02-20 RX ORDER — OXYCODONE AND ACETAMINOPHEN 10; 325 MG/1; MG/1
1 TABLET ORAL EVERY 6 HOURS PRN
Qty: 110 TABLET | Refills: 0 | Status: SHIPPED | OUTPATIENT
Start: 2025-02-28 | End: 2025-03-30

## 2025-02-20 ASSESSMENT — ENCOUNTER SYMPTOMS
FATIGUE: 0
NAUSEA: 0
PALPITATIONS: 0
COUGH: 0
VOMITING: 0
UNEXPECTED WEIGHT CHANGE: 0
VOICE CHANGE: 0
BACK PAIN: 1
CHILLS: 0
WHEEZING: 0
FEVER: 0
SHORTNESS OF BREATH: 0
ACTIVITY CHANGE: 0
SLEEP DISTURBANCE: 0
DIARRHEA: 0

## 2025-02-20 ASSESSMENT — PAIN - FUNCTIONAL ASSESSMENT: PAIN_FUNCTIONAL_ASSESSMENT: 0-10

## 2025-02-20 ASSESSMENT — PATIENT HEALTH QUESTIONNAIRE - PHQ9
SUM OF ALL RESPONSES TO PHQ9 QUESTIONS 1 & 2: 0
2. FEELING DOWN, DEPRESSED OR HOPELESS: NOT AT ALL
1. LITTLE INTEREST OR PLEASURE IN DOING THINGS: NOT AT ALL

## 2025-02-20 ASSESSMENT — LIFESTYLE VARIABLES
SKIP TO QUESTIONS 9-10: 1
HOW OFTEN DO YOU HAVE SIX OR MORE DRINKS ON ONE OCCASION: NEVER
AUDIT-C TOTAL SCORE: 0
HOW MANY STANDARD DRINKS CONTAINING ALCOHOL DO YOU HAVE ON A TYPICAL DAY: PATIENT DOES NOT DRINK
TOTAL SCORE: 0
HOW OFTEN DO YOU HAVE A DRINK CONTAINING ALCOHOL: NEVER

## 2025-02-20 ASSESSMENT — PAIN DESCRIPTION - DESCRIPTORS: DESCRIPTORS: ACHING

## 2025-02-20 ASSESSMENT — PAIN SCALES - GENERAL
PAINLEVEL_OUTOF10: 8
PAINLEVEL_OUTOF10: 8

## 2025-02-20 NOTE — PROGRESS NOTES
MEDICATION NAME: Percocet   STRENGTH:   LAST FILL DATE: 25  DATE LAST TAKEN: 25  QUANTITY FILLED: 110  QUANTITY REMAININ  COUNT COMPLETED BY: JOAN ELKINS and SAMY SAHNI      UDS LAST COMPLETED:   CONTROLLED SUBSTANCES AGREEMENT LAST SIGNED:   ORT LAST COMPLETED:  Modified [employer] disability form filled out annually.

## 2025-02-20 NOTE — PROGRESS NOTES
Subjective   Patient ID: Sarah Mg is a 64 y.o. male who presents for Back Pain.  Patient is a 64-year-old male with a Worker's Comp. related injury of postlaminectomy syndrome the presents today for follow-up.  Patient complains of mostly low back and left hip pain.  He did have an almost near fall due to slipping on the ice.  Patient did not have any trauma continued to note that this seemed to aggravate his axial back pain.  Patient is stable and then his new home.  He denies any new Worker's Comp. claims charges or allowances.    Back Pain  Pertinent negatives include no chest pain or fever.       Review of Systems   Constitutional:  Negative for activity change, chills, fatigue, fever and unexpected weight change.   HENT:  Negative for ear pain and voice change.    Eyes:  Negative for visual disturbance.   Respiratory:  Negative for cough, shortness of breath and wheezing.    Cardiovascular:  Negative for chest pain and palpitations.   Gastrointestinal:  Negative for diarrhea, nausea and vomiting.   Musculoskeletal:  Positive for back pain and gait problem.   Psychiatric/Behavioral:  Negative for behavioral problems, self-injury, sleep disturbance and suicidal ideas.        Objective   Physical Exam  Vitals reviewed.   Constitutional:       Appearance: Normal appearance.   HENT:      Head: Normocephalic and atraumatic.      Mouth/Throat:      Mouth: Mucous membranes are moist.   Neck:      Vascular: No JVD.   Pulmonary:      Effort: Pulmonary effort is normal. No tachypnea or bradypnea.   Abdominal:      Palpations: Abdomen is soft.   Musculoskeletal:      Lumbar back: Spasms and tenderness present. Positive left straight leg raise test. Negative right straight leg raise test.      Comments: STR WNL, hypo sensation lateral lle thigh. DTR = BLE, antalgic gait.    Skin:     General: Skin is warm and dry.   Neurological:      Mental Status: He is alert and oriented to person, place, and time.   Psychiatric:          Mood and Affect: Mood normal.         Behavior: Behavior normal. Behavior is cooperative.       Assessment/Plan   Problem List Items Addressed This Visit             ICD-10-CM    Postlaminectomy syndrome of lumbar region M96.1    History of ongoing treatment with high-risk medication - Primary Z79.899     I had nice discussion with the patient today our plan will be as follows.      Radiology: [ none at this time ]      Physically:  [ continue modification of activities, healthy lifestyle choice ]      Psychologically:  [ No acute psychological concerns. There are no mental health issues of which I am aware that are contributing to the patient's pain. There are no substance abuse or alcohol abuse issues of which I am aware that are contributing to the patient's pain. ]      Medication: [ I will refill the patient's opioids today for 2 month.  The patient continues to see benefit and improvement in their quality of life and ability to maintain ADLs.  Patient educated about the risks of taking opioids and operating a motor vehicle.  Patient reports no adverse side effects to current medication regimen.  Current regimen does allow patient to maintain ADLs.  Patient reports no new neurologic symptoms, new pain areas, or exacerbation in pain today.  Patient reports they are happy with current treatment care path.    OARRS was reviewed and was consistent with the history.    Patient has been educated on the risks, benefits, and alternatives of controlled substances as well as the proper way to store these medications.  The patient and I discussed the nature of this medication and its side effects.  We discussed tolerance, physical dependence, psychological dependence, addiction and opioid-induced hyperalgesia.  We discussed the potential need to wean from this medication.  We discussed the availability of programs that can help with this process if necessary.  We discussed safety issues related to opioids including  safe storage.  We discussed the fact that the patient should not drive an automobile or operate heavy machinery while taking this medication.  A prescription for naloxone was offered to the patient.  The patient will be re-evaluated for the need to continue opioid therapy in 60 days.  Pt to submit sample for tox screening.  ]      Duration:  [ 2 months ]      Intervention:  [ none at this time. Patient is stable.  ]        Please note that this report has been produced using speech recognition software. It may contain errors related to grammar, punctuation or spelling. Electronically signed, but not reviewed.          Vito Chaves PA-C 02/20/25 11:50 AM

## 2025-02-22 LAB
1OH-MIDAZOLAM UR-MCNC: NEGATIVE NG/ML
7AMINOCLONAZEPAM UR-MCNC: NEGATIVE NG/ML
A-OH ALPRAZ UR-MCNC: NEGATIVE NG/ML
A-OH-TRIAZOLAM UR-MCNC: NEGATIVE NG/ML
AMPHETAMINES UR QL: NEGATIVE NG/ML
BARBITURATES UR QL: NEGATIVE NG/ML
BZE UR QL: NEGATIVE NG/ML
CODEINE UR-MCNC: NEGATIVE NG/ML
CREAT UR-MCNC: 130.4 MG/DL
DRUG SCREEN COMMENT UR-IMP: ABNORMAL
EDDP UR-MCNC: NEGATIVE NG/ML
FENTANYL UR-MCNC: NEGATIVE NG/ML
HYDROCODONE UR-MCNC: NEGATIVE NG/ML
HYDROMORPHONE UR-MCNC: NEGATIVE NG/ML
LORAZEPAM UR-MCNC: NEGATIVE NG/ML
METHADONE UR-MCNC: NEGATIVE NG/ML
MORPHINE UR-MCNC: NEGATIVE NG/ML
NORDIAZEPAM UR-MCNC: NEGATIVE NG/ML
NORFENTANYL UR-MCNC: NEGATIVE NG/ML
NORHYDROCODONE UR CFM-MCNC: NEGATIVE NG/ML
NOROXYCODONE UR CFM-MCNC: 3590 NG/ML
NORTRAMADOL UR-MCNC: NEGATIVE NG/ML
OH-ETHYLFLURAZ UR-MCNC: NEGATIVE NG/ML
OXAZEPAM UR-MCNC: NEGATIVE NG/ML
OXIDANTS UR QL: NEGATIVE MCG/ML
OXYCODONE UR CFM-MCNC: 996 NG/ML
OXYMORPHONE UR CFM-MCNC: 1730 NG/ML
PCP UR QL: NEGATIVE NG/ML
PH UR: 6.7 [PH] (ref 4.5–9)
QUEST 6 ACETYLMORPHINE: NEGATIVE NG/ML
QUEST NOTES AND COMMENTS: ABNORMAL
QUEST ZOLPIDEM: NEGATIVE NG/ML
TEMAZEPAM UR-MCNC: NEGATIVE NG/ML
THC UR QL: NEGATIVE NG/ML
TRAMADOL UR-MCNC: NEGATIVE NG/ML
ZOLPIDEM PHENYL-4-CARB UR CFM-MCNC: NEGATIVE NG/ML

## 2025-04-18 ENCOUNTER — OFFICE VISIT (OUTPATIENT)
Dept: PAIN MEDICINE | Facility: CLINIC | Age: 65
End: 2025-04-18
Payer: COMMERCIAL

## 2025-04-18 VITALS
BODY MASS INDEX: 22.88 KG/M2 | HEART RATE: 63 BPM | WEIGHT: 151 LBS | SYSTOLIC BLOOD PRESSURE: 147 MMHG | RESPIRATION RATE: 20 BRPM | DIASTOLIC BLOOD PRESSURE: 94 MMHG | HEIGHT: 68 IN

## 2025-04-18 DIAGNOSIS — M51.26 DISPLACEMENT OF LUMBAR INTERVERTEBRAL DISC: ICD-10-CM

## 2025-04-18 DIAGNOSIS — M96.1 POSTLAMINECTOMY SYNDROME OF LUMBAR REGION: ICD-10-CM

## 2025-04-18 DIAGNOSIS — M51.369 DEGENERATION OF INTERVERTEBRAL DISC OF LUMBAR REGION WITHOUT DISCOGENIC BACK PAIN OR LOWER EXTREMITY PAIN: Primary | ICD-10-CM

## 2025-04-18 PROCEDURE — 99214 OFFICE O/P EST MOD 30 MIN: CPT | Performed by: PHYSICIAN ASSISTANT

## 2025-04-18 RX ORDER — OXYCODONE AND ACETAMINOPHEN 10; 325 MG/1; MG/1
1 TABLET ORAL EVERY 6 HOURS PRN
Qty: 110 TABLET | Refills: 0 | Status: SHIPPED | OUTPATIENT
Start: 2025-04-29 | End: 2025-05-29

## 2025-04-18 RX ORDER — OXYCODONE AND ACETAMINOPHEN 10; 325 MG/1; MG/1
1 TABLET ORAL EVERY 6 HOURS PRN
Qty: 110 TABLET | Refills: 0 | Status: SHIPPED | OUTPATIENT
Start: 2025-05-29 | End: 2025-06-28

## 2025-04-18 ASSESSMENT — ENCOUNTER SYMPTOMS
UNEXPECTED WEIGHT CHANGE: 0
NAUSEA: 0
FEVER: 0
ACTIVITY CHANGE: 0
SHORTNESS OF BREATH: 0
WHEEZING: 0
FATIGUE: 0
SLEEP DISTURBANCE: 0
VOICE CHANGE: 0
VOMITING: 0
COUGH: 0
CHILLS: 0
DIARRHEA: 0
BACK PAIN: 1
PALPITATIONS: 0

## 2025-04-18 ASSESSMENT — PAIN SCALES - GENERAL
PAINLEVEL_OUTOF10: 7
PAINLEVEL_OUTOF10: 7

## 2025-04-18 ASSESSMENT — PAIN DESCRIPTION - DESCRIPTORS: DESCRIPTORS: ACHING;PRESSURE

## 2025-04-18 ASSESSMENT — PAIN - FUNCTIONAL ASSESSMENT: PAIN_FUNCTIONAL_ASSESSMENT: 0-10

## 2025-04-18 NOTE — PROGRESS NOTES
Subjective   Patient ID: Sarah Mg is a 64 y.o. male who presents for Follow-up and Back Pain (Follow up appt for back pain.).  Patient is a 64-year-old syndrome DDD and intervertebral disc displacement the presents today for follow-up.  Patient denies that his physical therapy has been beneficial continue complaining of axial pain continue to have left lower extremity radiculopathy.  Patient's does report benefit from PT.  Lysing his medications without adverse reactions denies any injuries traumas or falls.    Back Pain  Pertinent negatives include no chest pain or fever.       Review of Systems   Constitutional:  Negative for activity change, chills, fatigue, fever and unexpected weight change.   HENT:  Negative for ear pain and voice change.    Eyes:  Negative for visual disturbance.   Respiratory:  Negative for cough, shortness of breath and wheezing.    Cardiovascular:  Negative for chest pain and palpitations.   Gastrointestinal:  Negative for diarrhea, nausea and vomiting.   Musculoskeletal:  Positive for back pain and gait problem.   Psychiatric/Behavioral:  Negative for behavioral problems, self-injury, sleep disturbance and suicidal ideas.        Objective   Physical Exam  Vitals reviewed.   Constitutional:       Appearance: Normal appearance.   HENT:      Head: Normocephalic and atraumatic.      Mouth/Throat:      Mouth: Mucous membranes are moist.   Neck:      Vascular: No JVD.   Pulmonary:      Effort: Pulmonary effort is normal. No tachypnea or bradypnea.   Abdominal:      Palpations: Abdomen is soft.   Musculoskeletal:      Lumbar back: Spasms and tenderness present. Positive left straight leg raise test. Negative right straight leg raise test.      Comments: STR WNL, hypo sensation lateral lle thigh. DTR = BLE, antalgic gait.    Skin:     General: Skin is warm and dry.   Neurological:      Mental Status: He is alert and oriented to person, place, and time.   Psychiatric:         Mood and  Affect: Mood normal.         Behavior: Behavior normal. Behavior is cooperative.       Assessment/Plan   Problem List Items Addressed This Visit           ICD-10-CM    Postlaminectomy syndrome of lumbar region M96.1    Degeneration of intervertebral disc of lumbar region without discogenic back pain or lower extremity pain - Primary M51.369    Displacement of lumbar intervertebral disc M51.26   I had nice discussion with the patient today our plan will be as follows.      Radiology: [ none at this time ]      Physically:  [ continue modification of activities, healthy lifestyle choice ]      Psychologically:  [ No acute psychological concerns. There are no mental health issues of which I am aware that are contributing to the patient's pain. There are no substance abuse or alcohol abuse issues of which I am aware that are contributing to the patient's pain. ]      Medication: [ I will refill the patient's opioids today for 2 month.  The patient continues to see benefit and improvement in their quality of life and ability to maintain ADLs.  Patient educated about the risks of taking opioids and operating a motor vehicle.  Patient reports no adverse side effects to current medication regimen.  Current regimen does allow patient to maintain ADLs.  Patient reports no new neurologic symptoms, new pain areas, or exacerbation in pain today.  Patient reports they are happy with current treatment care path.    [place] was reviewed and was consistent with the history.    Patient has been educated on the risks, benefits, and alternatives of controlled substances as well as the proper way to store these medications.  The patient and I discussed the nature of this medication and its side effects.  We discussed tolerance, physical dependence, psychological dependence, addiction and opioid-induced hyperalgesia.  We discussed the potential need to wean from this medication.  We discussed the availability of programs that can help with  this process if necessary.  We discussed safety issues related to opioids including safe storage.  We discussed the fact that the patient should not drive an automobile or operate heavy machinery while taking this medication.  A prescription for naloxone was offered to the patient.  The patient will be re-evaluated for the need to continue opioid therapy in 60 days.   Pt tox screening reviewed and compliant ]      Duration:  [ 2 months ]      Intervention:  [In my assessment I think the patient will continue to benefit from physical therapy we will then resubmit for additional.  I will fax this to his physician or record recommendations for potential C9 obtaining]        Please note that this report has been produced using speech recognition software. It may contain errors related to grammar, punctuation or spelling. Electronically signed, but not reviewed.            Vito Chaves PA-C 04/18/25 10:52 AM

## 2025-04-18 NOTE — PROGRESS NOTES
MEDICATION NAME: Oxycodone  STRENGTH: 10/325mg  LAST FILL DATE: 3.27.25  DATE LAST TAKEN: 25  QUANTITY FILLED: 110  QUANTITY REMAININ  COUNT COMPLETED BY: JOAN ELKINS and SAMY MOFFETT      UDS LAST COMPLETED:   CONTROLLED SUBSTANCES AGREEMENT LAST SIGNED:   ORT LAST COMPLETED:  Modified [employer] disability form filled out annually.

## 2025-06-24 ENCOUNTER — OFFICE VISIT (OUTPATIENT)
Facility: CLINIC | Age: 65
End: 2025-06-24
Payer: COMMERCIAL

## 2025-06-24 VITALS
HEART RATE: 76 BPM | HEIGHT: 68 IN | RESPIRATION RATE: 16 BRPM | DIASTOLIC BLOOD PRESSURE: 75 MMHG | OXYGEN SATURATION: 99 % | BODY MASS INDEX: 22.88 KG/M2 | WEIGHT: 151 LBS | SYSTOLIC BLOOD PRESSURE: 113 MMHG

## 2025-06-24 DIAGNOSIS — M96.1 POSTLAMINECTOMY SYNDROME OF LUMBAR REGION: ICD-10-CM

## 2025-06-24 DIAGNOSIS — M51.369 DEGENERATION OF INTERVERTEBRAL DISC OF LUMBAR REGION WITHOUT DISCOGENIC BACK PAIN OR LOWER EXTREMITY PAIN: ICD-10-CM

## 2025-06-24 DIAGNOSIS — M51.26 DISPLACEMENT OF LUMBAR INTERVERTEBRAL DISC: ICD-10-CM

## 2025-06-24 PROCEDURE — 99214 OFFICE O/P EST MOD 30 MIN: CPT | Performed by: PHYSICIAN ASSISTANT

## 2025-06-24 RX ORDER — CYCLOBENZAPRINE HCL 10 MG
10 TABLET ORAL 3 TIMES DAILY PRN
Qty: 21 TABLET | Refills: 2 | Status: SHIPPED | OUTPATIENT
Start: 2025-06-24 | End: 2025-07-15

## 2025-06-24 RX ORDER — OXYCODONE AND ACETAMINOPHEN 10; 325 MG/1; MG/1
1 TABLET ORAL EVERY 6 HOURS PRN
Qty: 110 TABLET | Refills: 0 | Status: SHIPPED | OUTPATIENT
Start: 2025-06-27 | End: 2025-07-27

## 2025-06-24 RX ORDER — OXYCODONE AND ACETAMINOPHEN 10; 325 MG/1; MG/1
1 TABLET ORAL EVERY 6 HOURS PRN
Qty: 110 TABLET | Refills: 0 | Status: SHIPPED | OUTPATIENT
Start: 2025-07-27 | End: 2025-08-26

## 2025-06-24 ASSESSMENT — ENCOUNTER SYMPTOMS
CHILLS: 0
VOICE CHANGE: 0
BACK PAIN: 1
VOMITING: 0
PALPITATIONS: 0
ACTIVITY CHANGE: 0
FEVER: 0
COUGH: 0
NAUSEA: 0
WHEEZING: 0
SHORTNESS OF BREATH: 0
SLEEP DISTURBANCE: 0
FATIGUE: 0
UNEXPECTED WEIGHT CHANGE: 0
DIARRHEA: 0

## 2025-06-24 ASSESSMENT — PAIN SCALES - GENERAL
PAINLEVEL_OUTOF10: 7
PAINLEVEL_OUTOF10: 7

## 2025-06-24 ASSESSMENT — PAIN DESCRIPTION - DESCRIPTORS: DESCRIPTORS: SHARP;ACHING

## 2025-06-24 ASSESSMENT — PAIN - FUNCTIONAL ASSESSMENT: PAIN_FUNCTIONAL_ASSESSMENT: 0-10

## 2025-06-24 NOTE — PROGRESS NOTES
MEDICATION NAME: Percocet  STRENGTH: 10/325  LAST FILL DATE: 2025  DATE LAST TAKEN: 2025  QUANTITY FILLED: 110  QUANTITY REMAININ  COUNT COMPLETED BY: YAKELIN GUTIERREZ LPN and ADRIANA WEBB RN      UDS COMPLETED  CONTROLLED SUBSTANCES AGREEMENT SIGNED  ORT COMPLETED  Modified [employer] disability form filled out annually.

## 2025-06-24 NOTE — PROGRESS NOTES
Subjective   Patient ID: Sarah Mg is a 64 y.o. male who presents for Back Pain (Lower back pain [facility]).  Patient is a 64-year-old male with postlaminectomy syndrome DDD the presents today for follow-up.  Patient is continuing his approved therapy which did notes that is helpful for about 2 days he states that this will come to an end and he is not looking forward to it.  He continues to modify his activities tries healthy lifestyle choices.  Denies any injuries or trauma denies any adverse reactions to the use of his medications    Back Pain  Pertinent negatives include no chest pain or fever.       Review of Systems   Constitutional:  Negative for activity change, chills, fatigue, fever and unexpected weight change.   HENT:  Negative for ear pain and voice change.    Eyes:  Negative for visual disturbance.   Respiratory:  Negative for cough, shortness of breath and wheezing.    Cardiovascular:  Negative for chest pain and palpitations.   Gastrointestinal:  Negative for diarrhea, nausea and vomiting.   Musculoskeletal:  Positive for back pain and gait problem.   Psychiatric/Behavioral:  Negative for behavioral problems, self-injury, sleep disturbance and suicidal ideas.        Objective   Physical Exam  Vitals reviewed.   Constitutional:       Appearance: Normal appearance.   HENT:      Head: Normocephalic and atraumatic.      Mouth/Throat:      Mouth: Mucous membranes are moist.   Neck:      Vascular: No JVD.   Pulmonary:      Effort: Pulmonary effort is normal. No tachypnea or bradypnea.   Abdominal:      Palpations: Abdomen is soft.   Musculoskeletal:      Lumbar back: Spasms and tenderness present. Positive left straight leg raise test. Negative right straight leg raise test.      Comments: STR WNL, hypo sensation lateral lle thigh. DTR = BLE, antalgic gait.    Skin:     General: Skin is warm and dry.   Neurological:      Mental Status: He is alert and oriented to person, place, and time.   Psychiatric:          Mood and Affect: Mood normal.         Behavior: Behavior normal. Behavior is cooperative.       Assessment/Plan   Problem List Items Addressed This Visit           ICD-10-CM    Postlaminectomy syndrome of lumbar region M96.1    Degeneration of intervertebral disc of lumbar region without discogenic back pain or lower extremity pain M51.369    Displacement of lumbar intervertebral disc M51.26   I had nice discussion with the patient today our plan will be as follows.      Radiology: [ none at this time ]      Physically:  [ continue modification of activities, healthy lifestyle choice ]      Psychologically:  [ No acute psychological concerns. There are no mental health issues of which I am aware that are contributing to the patient's pain. There are no substance abuse or alcohol abuse issues of which I am aware that are contributing to the patient's pain. ]      Medication: [ I will refill the patient's opioids today for 2 month.  The patient continues to see benefit and improvement in their quality of life and ability to maintain ADLs.  Patient educated about the risks of taking opioids and operating a motor vehicle.  Patient reports no adverse side effects to current medication regimen.  Current regimen does allow patient to maintain ADLs.  Patient reports no new neurologic symptoms, new pain areas, or exacerbation in pain today.  Patient reports they are happy with current treatment care path.    [place] was reviewed and was consistent with the history.    Patient has been educated on the risks, benefits, and alternatives of controlled substances as well as the proper way to store these medications.  The patient and I discussed the nature of this medication and its side effects.  We discussed tolerance, physical dependence, psychological dependence, addiction and opioid-induced hyperalgesia.  We discussed the potential need to wean from this medication.  We discussed the availability of programs that can help  with this process if necessary.  We discussed safety issues related to opioids including safe storage.  We discussed the fact that the patient should not drive an automobile or operate heavy machinery while taking this medication.  A prescription for naloxone was offered to the patient.  The patient will be re-evaluated for the need to continue opioid therapy in 60 days. ]      Duration:  [ 2 months ]      Intervention:  [ none at this time. Patient is stable.  ]        Please note that this report has been produced using speech recognition software. It may contain errors related to grammar, punctuation or spelling. Electronically signed, but not reviewed.            Vito Chaves PA-C 06/24/25 10:40 AM

## 2025-07-20 NOTE — TELEPHONE ENCOUNTER
Pt called again asking if his [facility].  Please advise.    qDear: Mauri Moran MD    We had the pleasure of seeing your patient in the Pediatric Nephrology Clinic in [facility] on 25. Please find our consultation summary below.    Gianna Shepard is a 2 year old female who presents for a  a follow up visit regarding:    CHIEF COMPLAINT  Hypertension     Gianna is accompanied and history obtained by: Mother.    History of present illness:  Original HPI  1.  Gianna Shepard   was born on 2023 and admitted to the NICU at [facility] on 2021.  Pediatric nephrology is consulted for hypertension. Gianna Shepard was born at 38 and 3/7 weeks gestation by cesarian section to a 31 year old  four para three to four mother who was  O + blood type, HIV negative,   Hepatitis  B  Surface antigen negative,  RPR nonreactive,  Chlamydia trachomatis negative,  Neisseria gonorrhea negative,  Rubella immune,   Group   B  Streptococcus status unknown. She received cefazolin , one dose. Pregnancy was complicated by prenatal finding of left congenital diaphragmatic hernia. Gianna Shepard was born by cesarian section, noted to have three vessel umbilical cord,  Birth weight 2490 grams, birth length 47.5 cm, birth head circumference 33.5 cm,  Apgar scores seven at one minute and nine at five minutes.  Gianna Shepard underwent repair of the left congenital diaphragmatic hernia on 2021 with gortex patch. .she also underwent  Kent procedure and inversion appendectomy.   She developed irritability  And fever to 38.5 degrees C on 2021.  Blood culture was positive for  Staphylococcus aureus. On 2021 Gianna Shepard underwent wound closure.  2.   Antibiotic History:  - Perioperative Cefazolin 2021 - 2021  - Gentamicin x1  2021  - Ceftazidime 50mg/kg 2021 - 2021   - Vancomycin 15mg/kg 2021 - 2021  - Oxacillin 25mg/kg q6H  2021 - 2021   3.  ID data/cultures:  2021 CSF Bacterial Culture with  Gram stain No Growth   2021: peripheral blood culture Gram positive cocci in clusters  Staphylococcus  aureus   2021  Urine culture  +500 CFU/mL Staphylococcus aureus, MSSA  2021Transfused Blood Products, Bacterial Cx - No growth  2021 peripheral blood culture  No growth     2021 peripheral blood culture No growth   2021 central line culture   No growth   2021 blood culture  No growth  4.  Date                          CRP               PROCALCITONIN                                   (mg/dl)           (ng/ml)  2021               6.9                 0.28  2021               1.2  2021               0.4                 0.10     Gianna Shepard has received post surgical sedation including fentanyl continuous infusion (discontinued 20210, precedex continuous infusion (discontinued 2021), methadone discontinued 2021. Gianna Shepard is currently receiving clonidine  6 micrograms  NG q 8 hours and morphine sulfate as needed for agitation and withdrawal.  5.  Gianna Shepard is noted to have hypertension.  6.  2021  Labs  Sodium 140 , potassium 7.5, chloride  110, bicarbonate 19,  BUN  10 mg/dl, creatinine 0.24 mg/dl, calcium 10.6 mg/dl  Sodium 139 , potassium 4.7, chloride  106, bicarbonate 25,  BUN  9 mg/dl, creatinine 0.27 mg/dl, calcium 9.7 mg/dl  7.  2021  Urine analysis 2021  Urine sp grav less than 1.005, urine pH 7,  Urine trace leukocyte esterase on urine dipstick examination  Microscopic examination of the urine sediment demonstrates \"few\" bacteria.  8.  2021  Echocardiogram 2021  Echocardiogram is read as demonstrating small patent foramen ovale.  There is no evidence for coarctation of the aorta. There is no evidence for left ventricular hypertrophy.   9.  2021  Vascular duplex  of renal arteries 2021  Vascular duplex of renal arteries is read as demonstrating bilateral renal arteries patent, a single renal artery is seen on each side. The renal arteries are both patent.  The renal veins are both normal and patent.  This is a normal vascular duplex of renal arteries and veins.  10.  Blood pressures:  87 - 112 / 41 - 87 mm  Hg                                                     50 %              95 %              99 %  Systolic   blood pressure                    88                  105                110     Diastolic                              50                  68                  73  blood pressure              Mean arterial                                  63                  80                  85  blood pressure  11.  Gianna Shepard  has some elevated blood pressures and some normal blood pressures.    12.  2021  TSH  2.743 mcunits / ml normal  Free   T3  4 pg/ml normal  Free   T4  1.2 ng/dl nrmal  13.  2021  Renal ultrasound 2021  Right kidney  4.76 cm by 2.30 cm  Left kidney  5.40 cm by 3.31 cm  Expected kidney length based upon body length is 4.35 cm with 2 std dev 1.5 cm  The kidneys are normal with no pelvic dilatation, hydronephrosis, renal mass, renal cyst or nephrocalcinosis.  The renal ultrasound is normal.   14.  2021 - 2021   Blood pressures  97 - 115 /  41 - 94 mm   Hg   15.  2021 - 2021 11/11 systolic blood pressures greater than 50 % for age  7/11 diastolic blood pressures greater than 50 % for age  10/11 mean arterial pressures greater than 50% for age     6/11 systolic blood pressures greater than 95 % for age  2/11 diastolic blood pressures greater than 95 % for age  4/11 mean arterial pressures greater than 95% for age     4/11 systolic blood pressures greater than 99 % for age  2/11 diastolic blood pressures greater than 99 % for age  4/11 mean arterial pressures greater than 99% for age    16.  2021 - 2021     10/10 systolic blood pressures greater than 50 % for age  7/10 diastolic blood pressures greater than 50 % for age  10/10 mean arterial pressures greater than 50% for age     4/10 systolic blood pressures greater than 95 % for age  1/10 diastolic blood pressures greater than 95 % for age  3/10 mean arterial pressures greater than 95% for age     3/10 systolic blood pressures greater than 99 % for age  1/10 diastolic blood pressures greater than 99 % for age  2/10 mean arterial pressures greater than 99% for age   17.  2021  Renin level 8 ng/l/hour  Aldosterone  163 ng/dl  18.  2021  Blood pressures  2021 -  2021  Blood pressures  87 - 97 / 32  = 70 mm  Hg  MAP  54 - 76 mm  Hg  Most blood pressures are less than 99 % for age.   19.  2021  Blood pressures  2021 -  2021  Blood pressures  71 - 109 / 32  - 76 mm  Hg  MAP  54 - 88 mm  Hg  Most blood pressures are less than 99 % for age.   20.  2021  Blood pressures  2021 -  2021  Blood pressures  71 - 108 / 48 - 56 mm  Hg  MAP  61 - 73 mm  Hg  Most blood pressures are less than 99 % for age.   21. 2021  Blood pressures  2021 -  2021  Blood pressures  76 - 94 / 44 - 64 mm  Hg  MAP  59 - 69 mm  Hg  Most blood pressures are less than 99 % for age.   22.  2021  Blood pressures  2021 -  2021  Blood pressures  76 - 97 / 44 - 81 mm  Hg  MAP  69 -85 mm  Hg  Most blood pressures are less than 99 % for age.   23.  2021  Blood pressures  94 / 72 ,  101 / 63,  124 / 73  Two normal blood pressures, one elevated blood pressure   24.  2021  Clonidine discontinued  25.  2021  Blood pressures 69 / 44 mm  Hg, 106 / 55 mm  Hg, 124 / 73 mm  Hg, 101 / 63 mm  Hg  Blood pressures highly variable. There is no indication for antihypertensive medications at this time.   26.  2021  Blood pressures 69 / 44 mm  Hg, 110 / 57 mm  Hg, 89 / 68 mm   Hg  Blood pressures highly variable. There is no indication for antihypertensive medications at this time.       Today's Update:   Since the last  Pediatric   Nephrology clinic visit,   02.03.2025  Gianna presented with cough and rhinorrhea.   Azithromycin, cetirizie, prednisolone, albuterol and saline spray were prescribed for Gianna on 02.03.2025.   03.24.2025  Gianna presented with cough and rhinorrhea.  Azithromycin, cetirizie and prednisolone were prescribed for Gianna on 03.24.2025  There has been no history of fever,  There has been no  history of gross hematuria  There has been no history of foul smelling urine.  There has been no apparent nausea, no vomiting, no diarrhea.   There has been no constipation.  There has been no apparent abdominal pain, flank pain, dysuria.  There has been no edema  There has been no bruising or bleeding.  There has been no rash.  There has been no thrush.  There has been no joint pain or joint swelling.  There has been no pallor, cyanosis, erythema.   There has been no wheezing or shortness of breath.  There has been no mouth sore, no tremor.  There has been no nose bleed  There has been no hearing problem.  There has been no visual problem.    Review of Systems   Constitutional:  Negative for activity change, appetite change, chills, crying, diaphoresis, fatigue, fever, irritability and unexpected weight change.   HENT:  Positive for congestion and rhinorrhea. Negative for dental problem, drooling, ear discharge, ear pain, facial swelling, hearing loss, mouth sores, nosebleeds, sneezing, sore throat, tinnitus, trouble swallowing and voice change.    Eyes:  Negative for photophobia, pain, discharge, redness, itching and visual disturbance.   Respiratory:  Positive for cough. Negative for apnea, choking, wheezing and stridor.    Cardiovascular:  Negative for chest pain, palpitations, leg swelling and cyanosis.   Gastrointestinal:  Negative for abdominal distention, abdominal pain,  anal bleeding, blood in stool, constipation, diarrhea, nausea, rectal pain and vomiting.   Endocrine: Negative for cold intolerance, heat intolerance, polydipsia, polyphagia and polyuria.   Genitourinary:  Negative for decreased urine volume, difficulty urinating, dysuria, enuresis, flank pain, frequency, hematuria and urgency.   Musculoskeletal:  Negative for arthralgias, back pain, gait problem, joint swelling, myalgias, neck pain and neck stiffness.   Skin:  Negative for color change, pallor, rash and wound.   Allergic/Immunologic: Negative for environmental allergies, food allergies and immunocompromised state.   Neurological:  Negative for tremors, seizures, syncope, facial asymmetry, speech difficulty, weakness and headaches.   Hematological:  Negative for adenopathy. Does not bruise/bleed easily.   Psychiatric/Behavioral:  Negative for agitation, behavioral problems, confusion, hallucinations, self-injury and sleep disturbance. The patient is not hyperactive.        Current Outpatient Medications   Medication Sig Dispense Refill    albuterol 108 (90 Base) MCG/ACT inhaler Inhale 2 puffs into the lungs every 4 hours as needed for Shortness of Breath or Wheezing. 1 each 12    fluticasone propionate (FLOVENT HFA) 220 MCG/ACT inhaler Inhale 1 puff into the lungs 2 times daily. (Patient not taking: Reported on 7/23/2025) 12 g 3    dexAMETHasone (DECADRON) 4 MG tablet Take 1 tablet by mouth daily (with breakfast). Begin taking on February 4, 2025. (Patient not taking: Reported on 7/23/2025) 1 tablet 0    Spacer/Aero-Hold Chamber Mask Misc To be used with inhalers 1 each 3    ondansetron (ZOFRAN ODT) 4 MG disintegrating tablet Place 0.5 tablets onto the tongue every 6 hours. (Patient not taking: Reported on 7/23/2025) 10 tablet 0    erythromycin (ILOTYCIN) ophthalmic ointment Place 0.5 inches into both eyes nightly. (Patient not taking: Reported on 5/22/2024) 3.5 g 0    Albuterol Sulfate 2.5 MG/0.5ML Nebu Soln Inhale  0.5 mLs into the lungs every 4 to 6 hours as needed (wheezing, shortness of breath). 30 each 11    pediatric multivitamin (POLY-VI-SOL) solution Take 1 mL by mouth daily. (Patient not taking: Reported on 2025) 50 mL 12     No current facility-administered medications for this visit.       ALLERGIES:  No Known Allergies    Past Medical History:   Diagnosis Date    Congenital hernia of the diaphragm 2021    Repaired    Elevated blood pressure reading 2021    History of abdominal surgery 2021    Jackson's procedure to correct malrotation, inversion appendectomy    History of partial splenectomy 2021    Hypertension 2021    Hypocalcemia 2021    Hyponatremia 2021    Low serum cortisol level 2021    Oral thrush 2021    Other respiratory distress of  2021    Pulmonary hypoplasia 2021    Staphylococcus aureus bacteremia 2021       Past Surgical History:   Procedure Laterality Date    Delayed closure abdominal wall of open abdomen  2021    Intestinal malrotation repair  2021    Ben's procedure, inversion appendectomy    Past surgical history  2021    s/p repair of congenital diaphragmatic hernia    Splenectomy, partial  2021       FAMILY HISTORY  Mother healthy  Father healthy  Older sister,  Myesha Shepard, date of birth 2017     Maternal  Grand mother cancer  Maternal  Grand father hypothyroidism  Maternal uncle hypothyroidism  Maternal aunt  KIDNEY DISEASE    SOCIAL HISTORY  Gianna Shepard is living in  [place], [place] with her mother, sister  Myesha Shepard      Vitals:    25 1024   BP: 108/68   Pulse: 102         BSA: 0.68 meters squared  Growth percentiles: 26 %ile (Z= -0.63) based on CDC (Girls, 2-20 Years) Stature-for-age data based on Stature recorded on 2025. 57 %ile (Z= 0.16) based on CDC (Girls, 2-20 Years) weight-for-age data using data from 2025.    Physical Exam  Vitals and nursing note reviewed.   Constitutional:       General: She is active. She is not in acute distress.     Appearance: She is well-developed. She is not toxic-appearing or diaphoretic.   HENT:      Head: Normocephalic and atraumatic. No cranial deformity, facial anomaly or signs of injury.      Right Ear: Tympanic membrane, ear canal and external ear normal. There is no impacted cerumen. Tympanic membrane is not erythematous or bulging.      Left Ear: Tympanic membrane, ear canal and external ear normal. There is no impacted cerumen. Tympanic membrane is not erythematous or bulging.      Nose: Nose normal. No congestion or rhinorrhea.      Mouth/Throat:      Mouth: Mucous membranes are moist.      Dentition: No dental caries.      Pharynx: Oropharynx is clear. No oropharyngeal exudate or posterior oropharyngeal erythema.      Tonsils: No tonsillar exudate.   Eyes:      General: Red reflex is present bilaterally.         Right eye: No discharge.         Left eye: No discharge.      Extraocular Movements: Extraocular movements intact.      Conjunctiva/sclera: Conjunctivae normal.      Pupils: Pupils are equal, round, and reactive to light.   Cardiovascular:      Rate and Rhythm: Normal rate and regular rhythm.      Pulses: Normal pulses.      Heart sounds: Normal heart sounds, S1 normal and S2 normal. No murmur heard.     No friction rub. No gallop.   Pulmonary:      Effort: Pulmonary effort is normal. No respiratory distress, nasal flaring or retractions.      Breath sounds: Normal breath sounds. No stridor or decreased air movement. No wheezing, rhonchi or rales.   Abdominal:      General: Bowel sounds are normal. There is no distension.      Palpations: Abdomen is soft. There is mass (left lower quadrant fecal mass readily palpated).      Tenderness: There is no abdominal tenderness. There is no guarding or rebound.      Hernia: No hernia is present. There is no hernia in the left inguinal  area.   Genitourinary:     General: Normal vulva.      Labia: No rash or lesion.     Musculoskeletal:         General: No swelling, tenderness, deformity or signs of injury. Normal range of motion.      Cervical back: Normal range of motion and neck supple. No rigidity.   Lymphadenopathy:      Cervical: No cervical adenopathy.      Lower Body: No right inguinal adenopathy. No left inguinal adenopathy.   Skin:     General: Skin is warm.      Capillary Refill: Capillary refill takes less than 2 seconds.      Coloration: Skin is not cyanotic, jaundiced, mottled or pale.      Findings: No erythema, petechiae or rash. Rash is not purpuric. There is no diaper rash.      Comments: Surgical scar on left upper quadrant of the abdomen  Greenlandic spots on the back           Neurological:      General: No focal deficit present.      Mental Status: She is alert.      Cranial Nerves: No cranial nerve deficit.      Sensory: No sensory deficit.      Motor: No weakness or abnormal muscle tone.      Coordination: Coordination normal.      Gait: Gait normal.      Deep Tendon Reflexes: Reflexes normal.         Lab and Imaging Results      Assessment and Plan:  Gianna was seen today for office visit.    Diagnoses and all orders for this visit:    Secondary hypertension  -     Urine, Bacterial Culture; Future    Elevated blood pressure reading  -     Urine, Bacterial Culture; Future    Sacral pit  -     Urine, Bacterial Culture; Future        ASSESSMENT:  1.  Gianna Shepard   was born on 2023 and admitted to the NICU at [facility] on 2021.  Pediatric nephrology is consulted for hypertension. Gianna Shepard was born at 38 and 3/7 weeks gestation by cesarian section to a 31 year old  four para three to four mother who was  O + blood type, HIV negative,   Hepatitis  B  Surface antigen negative,  RPR nonreactive,  Chlamydia trachomatis negative,  Neisseria gonorrhea negative,  Rubella immune,   Group    B  Streptococcus status unknown. She received cefazolin , one dose. Pregnancy was complicated by prenatal finding of left congenital diaphragmatic hernia. Gianna Shepard was born by cesarian section, noted to have three vessel umbilical cord,  Birth weight 2490 grams, birth length 47.5 cm, birth head circumference 33.5 cm,  Apgar scores seven at one minute and nine at five minutes.  Gianna Shepard underwent repair of the left congenital diaphragmatic hernia on 2021 with gortex patch. .she also underwent  Ell[place]ay procedure and inversion appendectomy.   She developed irritability and fever to 38.5 degrees C on 2021.  Blood culture was positive for  Staphylococcus aureus. On 2021 Gianna Shepard underwent wound closure.  2.   Antibiotic History:  - Perioperative Cefazolin 2021 - 2021  - Gentamicin x1  2021  - Ceftazidime 50mg/kg 2021 - 2021   - Vancomycin 15mg/kg 2021 - 2021  - Oxacillin 25mg/kg q6H 2021 - 2021   3.  ID data/cultures:  2021 CSF Bacterial Culture with  Gram stain No Growth   2021: peripheral blood culture Gram positive cocci in clusters  Staphylococcus  aureus   2021  Urine culture  +500 CFU/mL Staphylococcus aureus, MSSA  2021Transfused Blood Products, Bacterial Cx - No growth  2021 peripheral blood culture  No growth     2021 peripheral blood culture No growth   2021 central line culture   No growth   2021 blood culture  No growth  4.  Date                          CRP               PROCALCITONIN                                   (mg/dl)           (ng/ml)  2021               6.9                 0.28  2021               1.2  2021               0.4                 0.10     Gianna Shepard has received post surgical sedation including fentanyl continuous infusion (discontinued , precedex continuous infusion  (discontinued 2021), methadone discontinued 2021. Gianna Shepard is currently receiving clonidine  6 micrograms  NG q 8 hours and morphine sulfate as needed for agitation and withdrawal.  5.  Gianna Shepard is noted to have hypertension.  6.  2021  Labs  Sodium 140 , potassium 7.5, chloride  110, bicarbonate 19,  BUN  10 mg/dl, creatinine 0.24 mg/dl, calcium 10.6 mg/dl  Sodium 139 , potassium 4.7, chloride  106, bicarbonate 25,  BUN  9 mg/dl, creatinine 0.27 mg/dl, calcium 9.7 mg/dl  7.  2021  Urine analysis 2021  Urine sp grav less than 1.005, urine pH 7,  Urine trace leukocyte esterase on urine dipstick examination  Microscopic examination of the urine sediment demonstrates \"few\" bacteria.  8.  2021  Echocardiogram 2021  Echocardiogram is read as demonstrating small patent foramen ovale.  There is no evidence for coarctation of the aorta. There is no evidence for left ventricular hypertrophy.   9.  2021  Vascular duplex of renal arteries 2021  Vascular duplex of renal arteries is read as demonstrating bilateral renal arteries patent, a single renal artery is seen on each side. The renal arteries are both patent.  The renal veins are both normal and patent.  This is a normal vascular duplex of renal arteries and veins.     10.  Blood pressures:  87 - 112 / 41 - 87 mm  Hg                                                     50 %              95 %              99 %  Systolic   blood pressure                    88                  105                110     Diastolic                              50                  68                  73  blood pressure              Mean arterial                                  63                  80                  85  blood pressure  11.  Gianna Shepard  has some elevated blood pressures and some normal blood pressures.    12.  2021  TSH  2.743 mcunits / ml normal  Free   T3  4 pg/ml  normal  Free   T4  1.2 ng/dl nrmal  13.  2021  Renal ultrasound 2021  Right kidney  4.76 cm by 2.30 cm  Left kidney  5.40 cm by 3.31 cm  Expected kidney length based upon body length is 4.35 cm with 2 std dev 1.5 cm  The kidneys are normal with no pelvic dilatation, hydronephrosis, renal mass, renal cyst or nephrocalcinosis.  The renal ultrasound is normal.   14. 2021 - 2021   Blood pressures  97 - 115 /  41 - 94 mm   Hg   15.  2021 - 2021     11/11 systolic blood pressures greater than 50 % for age  7/11 diastolic blood pressures greater than 50 % for age  10/11 mean arterial pressures greater than 50% for age     6/11 systolic blood pressures greater than 95 % for age  2/11 diastolic blood pressures greater than 95 % for age  4/11 mean arterial pressures greater than 95% for age     4/11 systolic blood pressures greater than 99 % for age  2/11 diastolic blood pressures greater than 99 % for age  4311 mean arterial pressures greater than 99% for age   16.  2021 - 2021     10/10 systolic blood pressures greater than 50 % for age  7/10 diastolic blood pressures greater than 50 % for age  10/10 mean arterial pressures greater than 50% for age     4/10 systolic blood pressures greater than 95 % for age  1/10 diastolic blood pressures greater than 95 % for age  3/10 mean arterial pressures greater than 95% for age     3/10 systolic blood pressures greater than 99 % for age  1/10 diastolic blood pressures greater than 99 % for age  2/10 mean arterial pressures greater than 99% for age   17.  2021  Renin level 8 ng/l/hour  Aldosterone  163 ng/dl  18.  2021  Blood pressures  2021 -  2021  Blood pressures  87 - 97 / 32  = 70 mm  Hg  MAP  54 - 76 mm  Hg  Most blood pressures are less than 99 % for age.   19. 2021  Blood pressures  2021 -  2021  Blood pressures  71 - 109 / 32  - 76 mm  Hg  MAP  54 - 88 mm  Hg  Most blood pressures are  less than 99 % for age.   20. 2021  Blood pressures  2021 -  2021  Blood pressures  71 - 108 / 48 - 56 mm  Hg  MAP  61 - 73 mm  Hg  Most blood pressures are less than 99 % for age.   21. 2021  Blood pressures  2021 -  2021  Blood pressures  76 - 94 / 44 - 64 mm  Hg  MAP  59 - 69 mm  Hg  Most blood pressures are less than 99 % for age.   22. 2021  Blood pressures  2021 -  2021  Blood pressures  76 - 97 / 44 - 81 mm  Hg  MAP  69 -85 mm  Hg  Most blood pressures are less than 99 % for age.   23. 2021  Blood pressures  94 / 72 ,  101 / 63,  124 / 73  Two normal blood pressures, one elevated blood pressure   24.  2021  Clonidine discontinued  25.  2021  Blood pressures 69 / 44 mm  Hg, 106 / 55 mm  Hg, 124 / 73 mm  Hg, 101 / 63 mm  Hg  Blood pressures highly variable. There is no indication for antihypertensive medications at this time.   26. 2021  Blood pressures 69 / 44 mm  Hg, 110 / 57 mm  Hg, 89 / 68 mm  Hg  Blood pressures highly variable. There is no indication for antihypertensive medications at this time.   27.  2021  Renal ultrasound 2021  Right kidney  5.67 cm by 2.91 cm  Left kidney  5.07 cm by 2.44 cm  Expected kidney length based upon body length is 4.75 cm with 2 std dev 1.5 cm  The kidneys are normal with no pelvic dilatation, hydronephrosis, renal mass, renal cyst or nephrocalcinosis.  The renal ultrasound is normal.   28.  Blood pressure  106 / 67 mm  Hg , 102/ 65 mm  Hg ,  96 / 55 mm  Hg   29.  2021  Gianna has had no symptoms or signs referable to kidneys or urinary tract from the time of her discharge from the  NICU at [facility] up to 2021.   30. 02.23.2022  Blood pressures were  98 - 103/ 50 - 56  mm  Hg on 02.23.2022 .  31. 02.23.2022  Gianna has had diaper rash for which she has been prescribed nystatin  32. 02.23.2022  Gianna has had no symptoms or signs referable to kidneys or urinary tract from  the last   Pediatric   Nephrology clinic visit on   2021   to the current   Pediatric   Nephrology clinic visit   on 02.23.2022  33.  02.23.2022  Reviewed bladder hygiene in detail  34.  08.24.2022  Blood pressures were  83 - 96 / 57 - 61 mm  Hg on 08.24.2022 .  35.  08.24.2022  Lab Results   Component Value Date    5COL Yellow 08/24/2022    5UAPP Clear 08/24/2022    5UGLU Negative 08/24/2022    5UBILI Negative 08/24/2022    5UKET Negative 08/24/2022    5USPG 1.010 08/24/2022    5URBC Negative 08/24/2022    5UPH 6.0 08/24/2022    5UPROT Negative 08/24/2022    5UURP Normal 08/24/2022    POCTUNITR Negative 08/24/2022    5UWBC Small (A) 08/24/2022 08.24.2022  Urine analysis  08.24.2022  Urine sp gravity  1.010  Urine pH  6  Urine 1+ leukocyte esterase, otherwise, negative on dips tick examination  36.  08.23.2023  Blood pressures were  92 - 95 / 58 - 62 mm  Hg on 08.23.2023 .  37.  08.23.2023  Gianna has had no symptoms or signs referable to kidneys or urinary tract from the last   Pediatric   Nephrology clinic visit on   08.24.2022 to the current   Pediatric   Nephrology clinic visit on 08.23.2023.  38.  02.28.2024  Gianna presented with acute cough.   Testing for  Covid - 19 was negative on   02.28.2024  Testing for  Influenza  A was negative on   02.28.2024  Testing for  Influenza  B was negative on   02.28.2024  Testing for  RSV was negative on   02.28.2024  XR Chest 02.28.2024  IMPRESSION:  Mild central peribronchial cuffing, consistent with a viral process and/or reactive airway disease. No focal consolidation.   Dexamethasone injection X one and albuterol were administered in ED at [facility]   39.  03.19.2024  Gianna presented with cough, congestion, rhinorrhea, left ear pain  Amoxicillin was prescribed.   40.  03.29.2024  Gianna presented with cough   Testing for  Covid - 19 was negative on   03.29.2024  Testing for  Influenza  A was negative on   03.29.2024  Testing for  Influenza  B was negative on    03.29.2024  Testing for  RSV was negative on   03.29.2024  XR Chest 03.29.2024  IMPRESSION:  No radiographic evidence of acute cardiopulmonary disease.  Dexamethasone injection X one was administered in ED at [facility]   41.  08.21.2024  Blood pressures were  82 - 100 / 50 - 64 mm  Hg on 08.21.2024  42.  08.21.2024  Gianna has not had fever,  gross  hematuria, foul smelling urine, nausea, vomiting, diarrhea. There has  been constipoation. There has been intermittent abdominal pain. There has been no flank pain or dysuria.   Kelsie is completley continent of urine and fecal material during the daytime and the night time.   There has been no edema, bruising, bleeding, rash, thrush.    43.  08.21.2024  Gianna has had no symptoms or signs referable to kidneys or urinary tract from the last   Pediatric   Nephrology clinic visit on   08.23.2024   to the current   Pediatric   Nephrology clinic visit on 08.21.2024  44.  02.03.2025  Gianna presented with cough and rhinorrhea.   Azithromycin, cetirizie, prednisolone, albuterol and saline spray were prescribed for Gianna on 02.03.2025.   45.  03.24.2025  Gianna presented with cough and rhinorrhea.  Azithromycin, cetirizie and prednisolone were prescribed for Gianna on 03.24.2025  46.  07.23.2025 07.23.2025  Urine analysis  07.23.2025  Urine sp gravity  1.017  Urine pH  6  Urine 2++ leukocyte esterase, otherwise, negative on dipstick examination  Urine negative on microscopic examination of the urine sediment  47.  07.23.2025  Blood pressures were  112 / 64 mm Hg, then 109 / 66 mm Hg, then 108 / 68 mm  Hg on 07.23.2025   There is no need for antihypertensive medications at this time.   48.  07.23.2025  There has been no history of fever, gross hematuria, foul smelling urine, nausea, vomiting, diarrhea, constipation, abdominal pain, flank pain, dysuria, secondary daytime or night time fecal or urinary incontinence, edema, bruising, bleeding, rash, thrush, joint pain, joint swelling,  wheezing, shortness of breath, mouth sores, tremors., nose bleeds, blurry vision, dizziness, hearing problems, vision problems. Gianna had one headache in early July 2025, there was headache in the right temporl region, the headache responded to tylenol.   49.  07.23.2025  Gianna has had no symptoms or signs referable to kidneys or urinary tract from the last Pediatric Nephrology clinic visit on 08.21.2024 to the current Pediatric   Nephrology clinic visit on 07.23.2025  50.  07.23.2025  Urne culture 07.23.2025  Urine culture has 10,000 col/ml to 50,00 col/ml Streptococcus alpha hemolytic - this is considered contamination and is nott consistent with a urinary tract infection.  Follow up as scheduled.         RECOMMENDATIONS:  1.  Follow up in twelve months or earlier if necessary  2.  Gianna does not require any antihypertensive medications at this time.   3.  Reviewed bladder hygiene (for infants) in detail  4.  Reviewed  dietary changes for constipation  5.  Check urine culture     I reviewed the above recommendations with patient/family who expressed understanding and agreed with this plan.    Thank you very much for allowing me to participate in the care of this patient. If you have any questions, please do not hesitate to contact me.     Shimon Basurto MD  Pediatric Nephrology  [facility]/ [facility]

## 2025-08-22 ENCOUNTER — OFFICE VISIT (OUTPATIENT)
Facility: CLINIC | Age: 65
End: 2025-08-22
Payer: COMMERCIAL

## 2025-08-22 VITALS
RESPIRATION RATE: 16 BRPM | DIASTOLIC BLOOD PRESSURE: 86 MMHG | BODY MASS INDEX: 22.88 KG/M2 | HEIGHT: 68 IN | SYSTOLIC BLOOD PRESSURE: 135 MMHG | HEART RATE: 63 BPM | WEIGHT: 151 LBS

## 2025-08-22 DIAGNOSIS — Z79.899 HISTORY OF ONGOING TREATMENT WITH HIGH-RISK MEDICATION: ICD-10-CM

## 2025-08-22 DIAGNOSIS — Z98.890 S/P SPINAL SURGERY: Primary | ICD-10-CM

## 2025-08-22 DIAGNOSIS — M96.1 POSTLAMINECTOMY SYNDROME OF LUMBAR REGION: ICD-10-CM

## 2025-08-22 DIAGNOSIS — M51.26 DISPLACEMENT OF LUMBAR INTERVERTEBRAL DISC: ICD-10-CM

## 2025-08-22 DIAGNOSIS — M51.369 DEGENERATION OF INTERVERTEBRAL DISC OF LUMBAR REGION WITHOUT DISCOGENIC BACK PAIN OR LOWER EXTREMITY PAIN: ICD-10-CM

## 2025-08-22 PROCEDURE — 99213 OFFICE O/P EST LOW 20 MIN: CPT

## 2025-08-22 RX ORDER — OXYCODONE AND ACETAMINOPHEN 10; 325 MG/1; MG/1
1 TABLET ORAL EVERY 6 HOURS PRN
Qty: 28 TABLET | Refills: 0 | Status: SHIPPED | OUTPATIENT
Start: 2025-08-26 | End: 2025-09-02

## 2025-08-22 RX ORDER — OXYCODONE AND ACETAMINOPHEN 10; 325 MG/1; MG/1
1 TABLET ORAL EVERY 4 HOURS PRN
Qty: 42 TABLET | Refills: 0 | Status: SHIPPED | OUTPATIENT
Start: 2025-09-02 | End: 2025-09-09

## 2025-08-22 ASSESSMENT — ENCOUNTER SYMPTOMS
WHEEZING: 0
FATIGUE: 0
FEVER: 0
UNEXPECTED WEIGHT CHANGE: 0
SHORTNESS OF BREATH: 0
VOMITING: 0
ACTIVITY CHANGE: 0
DIARRHEA: 0
COUGH: 0
VOICE CHANGE: 0
CHILLS: 0
SLEEP DISTURBANCE: 0
PALPITATIONS: 0
NAUSEA: 0
BACK PAIN: 1

## 2025-08-22 ASSESSMENT — PAIN SCALES - GENERAL
PAINLEVEL_OUTOF10: 7
PAINLEVEL_OUTOF10: 7

## 2025-08-22 ASSESSMENT — PAIN - FUNCTIONAL ASSESSMENT: PAIN_FUNCTIONAL_ASSESSMENT: 0-10

## 2025-08-22 ASSESSMENT — PATIENT HEALTH QUESTIONNAIRE - PHQ9
SUM OF ALL RESPONSES TO PHQ9 QUESTIONS 1 AND 2: 0
2. FEELING DOWN, DEPRESSED OR HOPELESS: NOT AT ALL
1. LITTLE INTEREST OR PLEASURE IN DOING THINGS: NOT AT ALL

## 2025-08-22 ASSESSMENT — LIFESTYLE VARIABLES: TOTAL SCORE: 0

## 2025-08-22 ASSESSMENT — PAIN DESCRIPTION - DESCRIPTORS: DESCRIPTORS: ACHING;PRESSURE

## 2025-08-23 LAB
1OH-MIDAZOLAM UR-MCNC: NORMAL NG/ML
7AMINOCLONAZEPAM UR-MCNC: NORMAL NG/ML
A-OH ALPRAZ UR-MCNC: NORMAL NG/ML
A-OH-TRIAZOLAM UR-MCNC: NORMAL NG/ML
AMPHETAMINES UR QL: NEGATIVE NG/ML
BARBITURATES UR QL: NEGATIVE NG/ML
BZE UR QL: NEGATIVE NG/ML
CODEINE UR-MCNC: NORMAL NG/ML
CREAT UR-MCNC: 91.4 MG/DL
DRUG SCREEN COMMENT UR-IMP: NORMAL
EDDP UR-MCNC: NORMAL NG/ML
FENTANYL UR-MCNC: NORMAL NG/ML
HYDROCODONE UR-MCNC: NORMAL UG/ML
HYDROMORPHONE UR-MCNC: NORMAL NG/ML
LORAZEPAM UR-MCNC: NORMAL NG/ML
METHADONE UR-MCNC: NORMAL UG/L
MORPHINE UR-MCNC: NORMAL NG/ML
NORDIAZEPAM UR-MCNC: NORMAL NG/ML
NORFENTANYL UR-MCNC: NORMAL NG/ML
NORHYDROCODONE UR CFM-MCNC: NORMAL NG/ML
NOROXYCODONE UR CFM-MCNC: NORMAL NG/ML
NORTRAMADOL UR-MCNC: NORMAL UG/ML
OH-ETHYLFLURAZ UR-MCNC: NORMAL NG/ML
OXAZEPAM UR-MCNC: NORMAL UG/ML
OXIDANTS UR QL: NEGATIVE MCG/ML
OXYCODONE UR CFM-MCNC: NORMAL NG/ML
OXYMORPHONE UR CFM-MCNC: NORMAL NG/ML
PCP UR QL: NEGATIVE NG/ML
PH UR: 5.8 [PH] (ref 4.5–9)
QUEST 6 ACETYLMORPHINE: NORMAL
QUEST NOTES AND COMMENTS: NORMAL
QUEST PATIENT HISTORICAL REPORT: NORMAL
QUEST ZOLPIDEM: NORMAL
TEMAZEPAM UR-MCNC: NORMAL NG/ML
THC UR QL: NEGATIVE NG/ML
TRAMADOL UR-MCNC: NORMAL UG/ML
ZOLPIDEM PHENYL-4-CARB UR CFM-MCNC: NORMAL NG/ML

## 2025-08-27 LAB
1OH-MIDAZOLAM UR-MCNC: NEGATIVE NG/ML
7AMINOCLONAZEPAM UR-MCNC: NEGATIVE NG/ML
A-OH ALPRAZ UR-MCNC: NEGATIVE NG/ML
A-OH-TRIAZOLAM UR-MCNC: NEGATIVE NG/ML
AMPHETAMINES UR QL: NEGATIVE NG/ML
BARBITURATES UR QL: NEGATIVE NG/ML
BZE UR QL: NEGATIVE NG/ML
CODEINE UR-MCNC: NEGATIVE NG/ML
CREAT UR-MCNC: 91.4 MG/DL
DRUG SCREEN COMMENT UR-IMP: ABNORMAL
EDDP UR-MCNC: NEGATIVE NG/ML
FENTANYL UR-MCNC: NEGATIVE NG/ML
HYDROCODONE UR-MCNC: NEGATIVE NG/ML
HYDROMORPHONE UR-MCNC: NEGATIVE NG/ML
LORAZEPAM UR-MCNC: NEGATIVE NG/ML
METHADONE UR-MCNC: NEGATIVE NG/ML
MORPHINE UR-MCNC: NEGATIVE NG/ML
NORDIAZEPAM UR-MCNC: NEGATIVE NG/ML
NORFENTANYL UR-MCNC: NEGATIVE NG/ML
NORHYDROCODONE UR CFM-MCNC: NEGATIVE NG/ML
NOROXYCODONE UR CFM-MCNC: 1480 NG/ML
NORTRAMADOL UR-MCNC: NEGATIVE NG/ML
OH-ETHYLFLURAZ UR-MCNC: NEGATIVE NG/ML
OXAZEPAM UR-MCNC: NEGATIVE NG/ML
OXIDANTS UR QL: NEGATIVE MCG/ML
OXYCODONE UR CFM-MCNC: 785 NG/ML
OXYMORPHONE UR CFM-MCNC: 1380 NG/ML
PCP UR QL: NEGATIVE NG/ML
PH UR: 5.8 [PH] (ref 4.5–9)
QUEST 6 ACETYLMORPHINE: NEGATIVE NG/ML
QUEST NOTES AND COMMENTS: ABNORMAL
QUEST ZOLPIDEM: NEGATIVE NG/ML
TEMAZEPAM UR-MCNC: NEGATIVE NG/ML
THC UR QL: NEGATIVE NG/ML
TRAMADOL UR-MCNC: NEGATIVE NG/ML
ZOLPIDEM PHENYL-4-CARB UR CFM-MCNC: NEGATIVE NG/ML